# Patient Record
Sex: FEMALE | Race: WHITE | NOT HISPANIC OR LATINO | Employment: OTHER | URBAN - NONMETROPOLITAN AREA
[De-identification: names, ages, dates, MRNs, and addresses within clinical notes are randomized per-mention and may not be internally consistent; named-entity substitution may affect disease eponyms.]

---

## 2018-07-31 ENCOUNTER — OFFICE VISIT (OUTPATIENT)
Dept: PSYCHIATRY | Facility: CLINIC | Age: 48
End: 2018-07-31

## 2018-07-31 VITALS
HEIGHT: 63 IN | BODY MASS INDEX: 31.71 KG/M2 | WEIGHT: 179 LBS | DIASTOLIC BLOOD PRESSURE: 86 MMHG | SYSTOLIC BLOOD PRESSURE: 136 MMHG | HEART RATE: 65 BPM

## 2018-07-31 DIAGNOSIS — E73.9 LACTOSE INTOLERANCE: ICD-10-CM

## 2018-07-31 DIAGNOSIS — K21.9 GASTROESOPHAGEAL REFLUX DISEASE WITHOUT ESOPHAGITIS: ICD-10-CM

## 2018-07-31 DIAGNOSIS — F41.1 GAD (GENERALIZED ANXIETY DISORDER): ICD-10-CM

## 2018-07-31 DIAGNOSIS — F33.1 MDD (MAJOR DEPRESSIVE DISORDER), RECURRENT EPISODE, MODERATE (HCC): Primary | ICD-10-CM

## 2018-07-31 PROCEDURE — 90792 PSYCH DIAG EVAL W/MED SRVCS: CPT | Performed by: NURSE PRACTITIONER

## 2018-07-31 RX ORDER — GABAPENTIN 100 MG/1
CAPSULE ORAL
Refills: 0 | COMMUNITY
Start: 2018-07-07

## 2018-07-31 RX ORDER — MIRTAZAPINE 15 MG/1
15 TABLET, FILM COATED ORAL NIGHTLY
Qty: 30 TABLET | Refills: 0 | Status: SHIPPED | OUTPATIENT
Start: 2018-07-31 | End: 2018-08-28 | Stop reason: SDUPTHER

## 2018-07-31 RX ORDER — ESCITALOPRAM OXALATE 10 MG/1
10 TABLET ORAL DAILY
Qty: 30 TABLET | Refills: 0 | Status: SHIPPED | OUTPATIENT
Start: 2018-07-31 | End: 2018-08-28 | Stop reason: SDUPTHER

## 2018-07-31 RX ORDER — LANSOPRAZOLE 30 MG/1
CAPSULE, DELAYED RELEASE ORAL
Refills: 1 | COMMUNITY
Start: 2018-07-07

## 2018-07-31 RX ORDER — ESTRADIOL 0.5 MG/1
0.5 TABLET ORAL DAILY
Refills: 1 | COMMUNITY
Start: 2018-07-07 | End: 2019-01-22

## 2018-07-31 RX ORDER — FLUTICASONE PROPIONATE 50 MCG
SPRAY, SUSPENSION (ML) NASAL
COMMUNITY
Start: 2017-09-08

## 2018-07-31 RX ORDER — ZOLPIDEM TARTRATE 5 MG/1
TABLET ORAL
Refills: 0 | COMMUNITY
Start: 2018-07-07 | End: 2018-07-31

## 2018-07-31 RX ORDER — MIRTAZAPINE 15 MG/1
15 TABLET, FILM COATED ORAL NIGHTLY
Qty: 30 TABLET | Refills: 0 | Status: SHIPPED | OUTPATIENT
Start: 2018-07-31 | End: 2018-07-31 | Stop reason: SDUPTHER

## 2018-07-31 RX ORDER — ESCITALOPRAM OXALATE 10 MG/1
10 TABLET ORAL DAILY
Qty: 30 TABLET | Refills: 0 | Status: SHIPPED | OUTPATIENT
Start: 2018-07-31 | End: 2018-07-31 | Stop reason: SDUPTHER

## 2018-07-31 RX ORDER — DULOXETIN HYDROCHLORIDE 30 MG/1
CAPSULE, DELAYED RELEASE ORAL
COMMUNITY
End: 2018-07-31

## 2018-07-31 NOTE — PROGRESS NOTES
"Subjective   Sally Domínguez is a 47 y.o. female who is here today for initial appointment.     This provider is located at [facility],  [address]  The patient  is seen remotely at [facility] in [place], [place]  using "[employer]OM, an encrypted service from one [facility] facility to another,  without staff present.    The patient’s condition being diagnosed/treated is appropriate for telemedicine. The provider identified him/herself as well as his or her credentials.     The patient and/or patients guardian consent to be seen remotely, and when consent is given they understand that the consent allows for patient identifiable information to be sent to a third party as needed.   They may refuse to be seen remotely at any time.  The electronic data is encrypted and password protected, and the patient has been advised of the potential risks to privacy notwithstanding such measures.      Chief Complaint:  Depression and anxiety    HPI:  History of Present Illness  patient was referred by her therapist for evaluation of medication related to her mood symptoms.  Patient reports she has been in treatment from an early age.  She does report that she has taken multiple psychotropics in the past and most recently was placed on Cymbalta.  She reports that she feels Cymbalta is helping maybe a little but not much\".  She has been most recently managed by her primary care provider.  She was referred for further evaluation of medication.  Patient has a long history beginning in her early 20s of significant depression she has had previous hospital admission due to severity of symptoms.  Patient present with significant depression. The patient reports depressive symptoms including depressed mood, crying spells, insomnia, hypersomnia, anhedonia, feelings of hopelessness, feelings of worthlessness and low energy, frequent death thoughts, poor concentration, sleep " "difficulty, and have caused impairment in important areas of functioning.  Depression rated 7/10 with 10 being the worst.  She reports she cries excessively-often crying during interview.   Protective factors include family.  Patient also reports significant anxiety-unable to think about anything else.  Worries about children.  She reports some preoccupation with brother in law who shot nephew 6 months ago.  She reports some discord at home with  who \"bosses\" her.  She has great difficulty remembering and concentrating-reports she has recently been attempting to complete her GED.  Patient was screened for obsessive-compulsive disorder she denied any symptoms.  Patient denied any difficulty with psychotic phenomenon.  Patient reports that her depression became much more severe after the birth of her children.  Patient denies any signs and symptoms of PTSD.     Past Psych History: Patient denies any suicide attempts in the past.  She has had one previous admission to [facility].  Has taken prozac, celexa, and zoloft in the past.  States zoloft and prozac run her \"crazy\".  Effexor and Wellbutrin also did not help very much.      Substance Abuse:  She denies any substance abuse-cigarettes, alcohol, drugs.      Past Social History: Patient was born raised in [facility] by parents-she denies any difficulty with abuse.  She is  has 2 children.  Age 17, 27.  She quit school at 14-.  She has one grandchild.  She lives at home with .  She attends [employer] regularly.  She denies any hobbies.      Family History:  family history includes Depression in her brother and mother; Schizophrenia in her maternal grandmother.    Medical/Surgical History:  No past medical history on file.  Past Surgical History:   Procedure Laterality Date   • CHOLECYSTECTOMY     • HYSTERECTOMY         Allergies not on file    Current Medications:   Current Outpatient Prescriptions   Medication Sig Dispense Refill   • " "Cholecalciferol (VITAMIN D3) 1000 units capsule Vitamin D3 2,000 unit tablet   Take 1 tablet every day by oral route.     • fluticasone (FLONASE) 50 MCG/ACT nasal spray fluticasone 50 mcg/actuation nasal spray,suspension   Spray 1 spray every day by nasal route.     • DULoxetine (CYMBALTA) 30 MG capsule duloxetine 30 mg capsule,delayed release     • estradiol (ESTRACE) 0.5 MG tablet Take 0.5 mg by mouth Daily.  1   • gabapentin (NEURONTIN) 100 MG capsule   0   • lansoprazole (PREVACID) 30 MG capsule   1   • zolpidem (AMBIEN) 5 MG tablet   0     No current facility-administered medications for this visit.        Review of Systems    Review of Systems - General ROS: negative for - chills, fever or malaise  Ophthalmic ROS: negative for - loss of vision  ENT ROS: negative for - hearing change  Allergy and Immunology ROS: negative for - hives  Hematological and Lymphatic ROS: negative for - bleeding problems  Endocrine ROS: negative for - skin changes  Respiratory ROS: no cough, shortness of breath, or wheezing  Cardiovascular ROS: no chest pain or dyspnea on exertion  Gastrointestinal ROS: no abdominal pain, change in bowel habits, or black or bloody stools  Genito-Urinary ROS: no dysuria, trouble voiding, or hematuria  Musculoskeletal ROS: negative for - gait disturbance  Neurological ROS: no TIA or stroke symptoms  Dermatological ROS: negative for rash    Objective   Physical Exam  Blood pressure 136/86, pulse 65, height 160 cm (62.99\"), weight 81.2 kg (179 lb).    Mental Status Exam:   Hygiene:   fair  Cooperation:  Cooperative  Eye Contact:  Good  Psychomotor Behavior:  Appropriate  Affect:  Full range  Hopelessness: Denies  Speech:  Normal  Thought Process:  Goal directed and Linear  Thought Content:  Mood congurent  Suicidal:  None  Homicidal:  None  Hallucinations:  None  Delusion:  None  Memory:  Intact  Orientation:  Person, Place, Time and Situation  Reliability:  fair  Insight:  Fair  Judgement:  " Fair  Impulse Control:  Fair  Physical/Medical Issues:  Yes GERD        Assessment/Plan   Diagnoses and all orders for this visit:    MDD (major depressive disorder), recurrent episode, moderate (CMS/HCC)  -     mirtazapine (REMERON) 15 MG tablet; Take 1 tablet by mouth Every Night.  -     escitalopram (LEXAPRO) 10 MG tablet; Take 1 tablet by mouth Daily. Take 1/2 tablet po daily for 1 week then increase to 1 tablet daily    MAU (generalized anxiety disorder)  -     mirtazapine (REMERON) 15 MG tablet; Take 1 tablet by mouth Every Night.  -     escitalopram (LEXAPRO) 10 MG tablet; Take 1 tablet by mouth Daily. Take 1/2 tablet po daily for 1 week then increase to 1 tablet daily    Lactose intolerance  -     mirtazapine (REMERON) 15 MG tablet; Take 1 tablet by mouth Every Night.  -     escitalopram (LEXAPRO) 10 MG tablet; Take 1 tablet by mouth Daily. Take 1/2 tablet po daily for 1 week then increase to 1 tablet daily    Gastroesophageal reflux disease without esophagitis  -     mirtazapine (REMERON) 15 MG tablet; Take 1 tablet by mouth Every Night.  -     escitalopram (LEXAPRO) 10 MG tablet; Take 1 tablet by mouth Daily. Take 1/2 tablet po daily for 1 week then increase to 1 tablet daily        Patient reports that she is having great deal of sleep issues Ambien does not appear to be working well we'll discontinue and substituted with Remeron both for antidepressant effect and sleep.  Patient has done well with Celexa in the past will convert to Lexapro from Cymbalta.  Patient was reminded to check having thoughts to harm herself or others to immediately seek assistance at the emergency room.  Patient is to continue psychotherapy with therapeutic solutions.   Patient will be admitted to clinic- explanation of diagnosis, treatment, treatment options were discussed and patient was agreeable to begin medication trials.    We discussed risks, benefits, and side effects of the above medication and the patient was agreeable  with the plan.    SUPPORTIVE PSYCHOTHERAPY: begining efforts to promote the therapeutic alliance, address the patient’s issues, and strengthen self awareness, insights, and coping skills.  Patient was educated to immediately go to nearest ER for assistance.  Patient was instructed to call clinic for any difficulty with medications.       We discussed risks, benefits, and side effects of the above medication and the patient was agreeable with the plan.     Return in about 4 weeks (around 8/28/2018).       Problem List: depression, anxiety    Short Term Goals:  Patient will be compliant with medication, have no significant medication related side effects.  Patient will be engaged in psychotherapy.  Patient will report subjective improvement of symptoms.         Long Term Goals:Patient will report complete remission of symptoms no longer requiring pharmacologic therapy or psychotherapy.

## 2018-08-28 DIAGNOSIS — F33.1 MDD (MAJOR DEPRESSIVE DISORDER), RECURRENT EPISODE, MODERATE (HCC): ICD-10-CM

## 2018-08-28 DIAGNOSIS — E73.9 LACTOSE INTOLERANCE: ICD-10-CM

## 2018-08-28 DIAGNOSIS — K21.9 GASTROESOPHAGEAL REFLUX DISEASE WITHOUT ESOPHAGITIS: ICD-10-CM

## 2018-08-28 DIAGNOSIS — F41.1 GAD (GENERALIZED ANXIETY DISORDER): ICD-10-CM

## 2018-08-28 RX ORDER — MIRTAZAPINE 15 MG/1
15 TABLET, FILM COATED ORAL NIGHTLY
Qty: 30 TABLET | Refills: 0 | Status: SHIPPED | OUTPATIENT
Start: 2018-08-28 | End: 2018-09-19 | Stop reason: SDUPTHER

## 2018-08-28 RX ORDER — ESCITALOPRAM OXALATE 10 MG/1
10 TABLET ORAL DAILY
Qty: 30 TABLET | Refills: 0 | Status: SHIPPED | OUTPATIENT
Start: 2018-08-28 | End: 2018-09-19 | Stop reason: SDUPTHER

## 2018-09-19 ENCOUNTER — TELEMEDICINE (OUTPATIENT)
Dept: PSYCHIATRY | Facility: CLINIC | Age: 48
End: 2018-09-19

## 2018-09-19 VITALS
SYSTOLIC BLOOD PRESSURE: 137 MMHG | WEIGHT: 193 LBS | BODY MASS INDEX: 34.2 KG/M2 | HEIGHT: 63 IN | DIASTOLIC BLOOD PRESSURE: 82 MMHG | HEART RATE: 87 BPM

## 2018-09-19 DIAGNOSIS — F33.1 MDD (MAJOR DEPRESSIVE DISORDER), RECURRENT EPISODE, MODERATE (HCC): Primary | ICD-10-CM

## 2018-09-19 DIAGNOSIS — F41.1 GAD (GENERALIZED ANXIETY DISORDER): ICD-10-CM

## 2018-09-19 DIAGNOSIS — K21.9 GASTROESOPHAGEAL REFLUX DISEASE WITHOUT ESOPHAGITIS: ICD-10-CM

## 2018-09-19 DIAGNOSIS — E73.9 LACTOSE INTOLERANCE: ICD-10-CM

## 2018-09-19 PROCEDURE — 99214 OFFICE O/P EST MOD 30 MIN: CPT | Performed by: NURSE PRACTITIONER

## 2018-09-19 RX ORDER — ARIPIPRAZOLE 2 MG/1
2 TABLET ORAL EVERY MORNING
Qty: 30 TABLET | Refills: 0 | Status: SHIPPED | OUTPATIENT
Start: 2018-09-19 | End: 2018-10-22 | Stop reason: SDUPTHER

## 2018-09-19 RX ORDER — MIRTAZAPINE 15 MG/1
7.5 TABLET, FILM COATED ORAL NIGHTLY
Qty: 15 TABLET | Refills: 0 | Status: SHIPPED | OUTPATIENT
Start: 2018-09-19 | End: 2018-10-22 | Stop reason: SDUPTHER

## 2018-09-19 RX ORDER — ESCITALOPRAM OXALATE 10 MG/1
10 TABLET ORAL DAILY
Qty: 30 TABLET | Refills: 0 | Status: SHIPPED | OUTPATIENT
Start: 2018-09-19 | End: 2018-10-22 | Stop reason: SDUPTHER

## 2018-09-19 NOTE — PROGRESS NOTES
"Subjective   Sally Domínguez is a 48 y.o. female who is here today for follow up appointment.     This provider is located at [facility],  [address]  The patient  is seen remotely at [facility] in [place], [place]  using [employer], an encrypted service from one [facility] facility to another,  without staff present.    The patient’s condition being diagnosed/treated is appropriate for telemedicine. The provider identified him/herself as well as his or her credentials.     The patient and/or patients guardian consent to be seen remotely, and when consent is given they understand that the consent allows for patient identifiable information to be sent to a third party as needed.   They may refuse to be seen remotely at any time.  The electronic data is encrypted and password protected, and the patient has been advised of the potential risks to privacy notwithstanding such measures.      Chief Complaint:  Depression and anxiety    HPI:  History of Present Illness  patient presents today for follow-up regarding anxiety and depression after medication.  She reports that her symptoms have lessened she however continues to have significant difficulty with anhedonia loss of energy and motivation.  She has had fewer crying spells however and rates her depression as improved.  Patient present with significant depression. The patient reports depressive symptoms including depressed mood, crying spells, insomnia, hypersomnia, anhedonia, feelings of hopelessness, feelings of worthlessness and low energy, frequent death thoughts, poor concentration, sleep difficulty, and have caused impairment in important areas of functioning.  Depression rated 5/10 with 10 being the worst.  She continues to have difficulty with anxiety states that she worries a lot \"I get from my mom\".  Patient reports that she has gained weight and her appetite is excessive.      Family " "History:  family history includes Depression in her brother and mother; Schizophrenia in her maternal grandmother.    Medical/Surgical History:  Past Medical History:   Diagnosis Date   • Chronic low back pain    • Diverticulosis    • GERD (gastroesophageal reflux disease)      Past Surgical History:   Procedure Laterality Date   • CHOLECYSTECTOMY     • HYSTERECTOMY         Allergies not on file    Current Medications:   Current Outpatient Prescriptions   Medication Sig Dispense Refill   • Cholecalciferol (VITAMIN D3) 1000 units capsule Vitamin D3 2,000 unit tablet   Take 1 tablet every day by oral route.     • escitalopram (LEXAPRO) 10 MG tablet Take 1 tablet by mouth Daily. Take 1/2 tablet po daily for 1 week then increase to 1 tablet daily 30 tablet 0   • estradiol (ESTRACE) 0.5 MG tablet Take 0.5 mg by mouth Daily.  1   • fluticasone (FLONASE) 50 MCG/ACT nasal spray fluticasone 50 mcg/actuation nasal spray,suspension   Spray 1 spray every day by nasal route.     • gabapentin (NEURONTIN) 100 MG capsule   0   • lansoprazole (PREVACID) 30 MG capsule   1   • mirtazapine (REMERON) 15 MG tablet Take 1 tablet by mouth Every Night. 30 tablet 0     No current facility-administered medications for this visit.        Review of Systems   Constitutional: Positive for activity change and appetite change.   Musculoskeletal: Positive for arthralgias.   Psychiatric/Behavioral: Positive for dysphoric mood.           Objective   Physical Exam  Blood pressure 137/82, pulse 87, height 160 cm (62.99\"), weight 87.5 kg (193 lb).    Mental Status Exam:   Hygiene:   fair  Cooperation:  Cooperative  Eye Contact:  Good  Psychomotor Behavior:  Appropriate  Affect:  Full range  Hopelessness: Denies  Speech:  Normal  Thought Process:  Goal directed and Linear  Thought Content:  Mood congurent  Suicidal:  None  Homicidal:  None  Hallucinations:  None  Delusion:  None  Memory:  Intact  Orientation:  Person, Place, Time and " Situation  Reliability:  fair  Insight:  Fair  Judgement:  Fair  Impulse Control:  Fair  Physical/Medical Issues:  Yes GERD        Assessment/Plan   Diagnoses and all orders for this visit:    MDD (major depressive disorder), recurrent episode, moderate (CMS/HCC)  -     mirtazapine (REMERON) 15 MG tablet; Take 0.5 tablets by mouth Every Night.  -     ARIPiprazole (ABILIFY) 2 MG tablet; Take 1 tablet by mouth Every Morning.  -     escitalopram (LEXAPRO) 10 MG tablet; Take 1 tablet by mouth Daily. Take 1/2 tablet po daily for 1 week then increase to 1 tablet daily    MAU (generalized anxiety disorder)  -     mirtazapine (REMERON) 15 MG tablet; Take 0.5 tablets by mouth Every Night.  -     ARIPiprazole (ABILIFY) 2 MG tablet; Take 1 tablet by mouth Every Morning.  -     escitalopram (LEXAPRO) 10 MG tablet; Take 1 tablet by mouth Daily. Take 1/2 tablet po daily for 1 week then increase to 1 tablet daily    Lactose intolerance  -     mirtazapine (REMERON) 15 MG tablet; Take 0.5 tablets by mouth Every Night.  -     ARIPiprazole (ABILIFY) 2 MG tablet; Take 1 tablet by mouth Every Morning.  -     escitalopram (LEXAPRO) 10 MG tablet; Take 1 tablet by mouth Daily. Take 1/2 tablet po daily for 1 week then increase to 1 tablet daily    Gastroesophageal reflux disease without esophagitis  -     mirtazapine (REMERON) 15 MG tablet; Take 0.5 tablets by mouth Every Night.  -     ARIPiprazole (ABILIFY) 2 MG tablet; Take 1 tablet by mouth Every Morning.  -     escitalopram (LEXAPRO) 10 MG tablet; Take 1 tablet by mouth Daily. Take 1/2 tablet po daily for 1 week then increase to 1 tablet daily        Patient does appear improved however continues to have residual symptoms.  Patient is continuing to have significant loss of energy and motivation she does have some difficulty with weight gain will cut Remeron in half explained to patient that weight gain and appetite could be part of side effects.  We will also begin low-dose Abilify for  adjuvant treatment of depression along with profile for minimal weight gain.    SUPPORTIVE PSYCHOTHERAPY: begining efforts to promote the therapeutic alliance, address the patient’s issues, and strengthen self awareness, insights, and coping skills.  Patient was educated to immediately go to nearest ER for assistance.  Patient was instructed to call clinic for any difficulty with medications.       We discussed risks, benefits, and side effects of the above medication and the patient was agreeable with the plan.     Return in about 4 weeks (around 10/17/2018).

## 2018-10-22 DIAGNOSIS — F33.1 MDD (MAJOR DEPRESSIVE DISORDER), RECURRENT EPISODE, MODERATE (HCC): ICD-10-CM

## 2018-10-22 DIAGNOSIS — E73.9 LACTOSE INTOLERANCE: ICD-10-CM

## 2018-10-22 DIAGNOSIS — K21.9 GASTROESOPHAGEAL REFLUX DISEASE WITHOUT ESOPHAGITIS: ICD-10-CM

## 2018-10-22 DIAGNOSIS — F41.1 GAD (GENERALIZED ANXIETY DISORDER): ICD-10-CM

## 2018-10-22 RX ORDER — ESCITALOPRAM OXALATE 10 MG/1
10 TABLET ORAL DAILY
Qty: 30 TABLET | Refills: 0 | Status: SHIPPED | OUTPATIENT
Start: 2018-10-22 | End: 2018-10-24 | Stop reason: SDUPTHER

## 2018-10-22 RX ORDER — MIRTAZAPINE 15 MG/1
7.5 TABLET, FILM COATED ORAL NIGHTLY
Qty: 15 TABLET | Refills: 0 | Status: SHIPPED | OUTPATIENT
Start: 2018-10-22 | End: 2018-11-28 | Stop reason: SINTOL

## 2018-10-22 RX ORDER — ARIPIPRAZOLE 2 MG/1
2 TABLET ORAL EVERY MORNING
Qty: 30 TABLET | Refills: 0 | Status: SHIPPED | OUTPATIENT
Start: 2018-10-22 | End: 2018-11-20 | Stop reason: SDUPTHER

## 2018-10-24 DIAGNOSIS — E73.9 LACTOSE INTOLERANCE: ICD-10-CM

## 2018-10-24 DIAGNOSIS — K21.9 GASTROESOPHAGEAL REFLUX DISEASE WITHOUT ESOPHAGITIS: ICD-10-CM

## 2018-10-24 DIAGNOSIS — F33.1 MDD (MAJOR DEPRESSIVE DISORDER), RECURRENT EPISODE, MODERATE (HCC): ICD-10-CM

## 2018-10-24 DIAGNOSIS — F41.1 GAD (GENERALIZED ANXIETY DISORDER): ICD-10-CM

## 2018-10-25 RX ORDER — ESCITALOPRAM OXALATE 10 MG/1
10 TABLET ORAL DAILY
Qty: 30 TABLET | Refills: 0 | Status: SHIPPED | OUTPATIENT
Start: 2018-10-25 | End: 2018-11-28

## 2018-11-20 DIAGNOSIS — F41.1 GAD (GENERALIZED ANXIETY DISORDER): ICD-10-CM

## 2018-11-20 DIAGNOSIS — E73.9 LACTOSE INTOLERANCE: ICD-10-CM

## 2018-11-20 DIAGNOSIS — K21.9 GASTROESOPHAGEAL REFLUX DISEASE WITHOUT ESOPHAGITIS: ICD-10-CM

## 2018-11-20 DIAGNOSIS — F33.1 MDD (MAJOR DEPRESSIVE DISORDER), RECURRENT EPISODE, MODERATE (HCC): ICD-10-CM

## 2018-11-20 RX ORDER — ARIPIPRAZOLE 2 MG/1
2 TABLET ORAL EVERY MORNING
Qty: 30 TABLET | Refills: 0 | Status: SHIPPED | OUTPATIENT
Start: 2018-11-20 | End: 2018-11-26

## 2018-11-23 DIAGNOSIS — F33.1 MDD (MAJOR DEPRESSIVE DISORDER), RECURRENT EPISODE, MODERATE (HCC): ICD-10-CM

## 2018-11-23 DIAGNOSIS — F41.1 GAD (GENERALIZED ANXIETY DISORDER): ICD-10-CM

## 2018-11-23 DIAGNOSIS — E73.9 LACTOSE INTOLERANCE: ICD-10-CM

## 2018-11-23 DIAGNOSIS — K21.9 GASTROESOPHAGEAL REFLUX DISEASE WITHOUT ESOPHAGITIS: ICD-10-CM

## 2018-11-26 RX ORDER — ARIPIPRAZOLE 2 MG/1
2 TABLET ORAL EVERY MORNING
Qty: 30 TABLET | Refills: 0 | Status: SHIPPED | OUTPATIENT
Start: 2018-11-26 | End: 2018-11-28

## 2018-11-28 ENCOUNTER — TELEMEDICINE (OUTPATIENT)
Dept: PSYCHIATRY | Facility: CLINIC | Age: 48
End: 2018-11-28

## 2018-11-28 VITALS
HEART RATE: 85 BPM | HEIGHT: 63 IN | WEIGHT: 201.8 LBS | SYSTOLIC BLOOD PRESSURE: 148 MMHG | DIASTOLIC BLOOD PRESSURE: 94 MMHG | BODY MASS INDEX: 35.75 KG/M2

## 2018-11-28 DIAGNOSIS — F41.1 GAD (GENERALIZED ANXIETY DISORDER): ICD-10-CM

## 2018-11-28 DIAGNOSIS — E73.9 LACTOSE INTOLERANCE: ICD-10-CM

## 2018-11-28 DIAGNOSIS — F33.1 MDD (MAJOR DEPRESSIVE DISORDER), RECURRENT EPISODE, MODERATE (HCC): ICD-10-CM

## 2018-11-28 DIAGNOSIS — K21.9 GASTROESOPHAGEAL REFLUX DISEASE WITHOUT ESOPHAGITIS: ICD-10-CM

## 2018-11-28 PROCEDURE — 99214 OFFICE O/P EST MOD 30 MIN: CPT | Performed by: NURSE PRACTITIONER

## 2018-11-28 RX ORDER — ARIPIPRAZOLE 2 MG/1
2 TABLET ORAL EVERY MORNING
Qty: 30 TABLET | Refills: 0 | Status: SHIPPED | OUTPATIENT
Start: 2018-11-28 | End: 2018-12-03

## 2018-11-28 RX ORDER — ESCITALOPRAM OXALATE 10 MG/1
10 TABLET ORAL DAILY
Qty: 30 TABLET | Refills: 0 | Status: SHIPPED | OUTPATIENT
Start: 2018-11-28 | End: 2018-12-03

## 2018-11-28 RX ORDER — CELECOXIB 200 MG/1
CAPSULE ORAL
Refills: 0 | COMMUNITY
Start: 2018-10-30

## 2018-11-28 NOTE — PROGRESS NOTES
Subjective   Sally Domínguez is a 48 y.o. female who is here today for follow up appointment.     This provider is located at [facility],  [address]  The patient  is seen remotely at [facility] in [place], [place]  using [employer], an encrypted service from one [facility] facility to another,  without staff present.    The patient’s condition being diagnosed/treated is appropriate for telemedicine. The provider identified him/herself as well as his or her credentials.     The patient and/or patients guardian consent to be seen remotely, and when consent is given they understand that the consent allows for patient identifiable information to be sent to a third party as needed.   They may refuse to be seen remotely at any time.  The electronic data is encrypted and password protected, and the patient has been advised of the potential risks to privacy notwithstanding such measures.      Chief Complaint:  Depression and anxiety    HPI:  History of Present Illness  patient presents today for follow-up regarding anxiety and depression after medication.  She is greatly concerned about weight gain.  Patient is having noted increase in weight since beginning medications.  She reports that her symptoms have lessened she however continues to have significant difficulty with anhedonia loss of energy and motivation.  She has had fewer crying spells however and rates her depression as improved. The patient reports depressive symptoms including depressed mood, crying spells, insomnia, hypersomnia, anhedonia, feelings of hopelessness, feelings of worthlessness and low energy, frequent death thoughts, poor concentration, sleep difficulty, and have caused impairment in important areas of functioning.  Depression rated 6/10 with 10 being the worst.  She continues to have difficulty with anxiety states it's a lot better.  Patiient reports that she has gained weight and  "her appetite is excessive.      Family History:  family history includes Depression in her brother and mother; Schizophrenia in her maternal grandmother.    Medical/Surgical History:  Past Medical History:   Diagnosis Date   • Chronic low back pain    • Diverticulosis    • GERD (gastroesophageal reflux disease)      Past Surgical History:   Procedure Laterality Date   • CHOLECYSTECTOMY     • HYSTERECTOMY         No Known Allergies    Current Medications:   Current Outpatient Medications   Medication Sig Dispense Refill   • ARIPiprazole (ABILIFY) 2 MG tablet TAKE 1 TABLET BY MOUTH EVERY MORNING 30 tablet 0   • Cholecalciferol (VITAMIN D3) 1000 units capsule Vitamin D3 2,000 unit tablet   Take 1 tablet every day by oral route.     • escitalopram (LEXAPRO) 10 MG tablet Take 1 tablet by mouth Daily. 30 tablet 0   • estradiol (ESTRACE) 0.5 MG tablet Take 0.5 mg by mouth Daily.  1   • fluticasone (FLONASE) 50 MCG/ACT nasal spray fluticasone 50 mcg/actuation nasal spray,suspension   Spray 1 spray every day by nasal route.     • gabapentin (NEURONTIN) 100 MG capsule   0   • lansoprazole (PREVACID) 30 MG capsule   1   • mirtazapine (REMERON) 15 MG tablet Take 0.5 tablets by mouth Every Night. 15 tablet 0     No current facility-administered medications for this visit.        Review of Systems   Constitutional: Positive for activity change and appetite change.   Musculoskeletal: Positive for arthralgias.   Psychiatric/Behavioral: Positive for dysphoric mood.           Objective   Physical Exam  Blood pressure 148/94, pulse 85, height 160 cm (62.99\"), weight 91.5 kg (201 lb 12.8 oz).    Mental Status Exam:   Hygiene:   fair  Cooperation:  Cooperative  Eye Contact:  Good  Psychomotor Behavior:  Appropriate  Affect:  Full range  Hopelessness: Denies  Speech:  Normal  Thought Process:  Goal directed and Linear  Thought Content:  Mood congurent  Suicidal:  None  Homicidal:  None  Hallucinations:  None  Delusion:  None  Memory:  " Intact  Orientation:  Person, Place, Time and Situation  Reliability:  fair  Insight:  Fair  Judgement:  Fair  Impulse Control:  Fair  Physical/Medical Issues:  Yes GERD        Assessment/Plan   Diagnoses and all orders for this visit:    MDD (major depressive disorder), recurrent episode, moderate (CMS/HCC)  -     escitalopram (LEXAPRO) 10 MG tablet; Take 1 tablet by mouth Daily.  -     ARIPiprazole (ABILIFY) 2 MG tablet; Take 1 tablet by mouth Every Morning.    MAU (generalized anxiety disorder)  -     escitalopram (LEXAPRO) 10 MG tablet; Take 1 tablet by mouth Daily.  -     ARIPiprazole (ABILIFY) 2 MG tablet; Take 1 tablet by mouth Every Morning.    Lactose intolerance  -     escitalopram (LEXAPRO) 10 MG tablet; Take 1 tablet by mouth Daily.  -     ARIPiprazole (ABILIFY) 2 MG tablet; Take 1 tablet by mouth Every Morning.    Gastroesophageal reflux disease without esophagitis  -     escitalopram (LEXAPRO) 10 MG tablet; Take 1 tablet by mouth Daily.  -     ARIPiprazole (ABILIFY) 2 MG tablet; Take 1 tablet by mouth Every Morning.        Patient does appear improved however continues to have residual symptoms.  Patient is continuing to have significant loss of energy and motivation she does have some difficulty with weight gain will cut Remeron in half explained to patient that weight gain and appetite could be part of side effects.  We will also begin low-dose Abilify for adjuvant treatment of depression along with profile for minimal weight gain.    SUPPORTIVE PSYCHOTHERAPY: begining efforts to promote the therapeutic alliance, address the patient’s issues, and strengthen self awareness, insights, and coping skills.  Patient was educated to immediately go to nearest ER for assistance.  Patient was instructed to call clinic for any difficulty with medications.       We discussed risks, benefits, and side effects of the above medication and the patient was agreeable with the plan.     Return in about 3 weeks (around  12/19/2018).

## 2018-11-29 ENCOUNTER — TELEPHONE (OUTPATIENT)
Dept: PSYCHIATRY | Facility: CLINIC | Age: 48
End: 2018-11-29

## 2018-12-03 DIAGNOSIS — F33.1 MDD (MAJOR DEPRESSIVE DISORDER), RECURRENT EPISODE, MODERATE (HCC): ICD-10-CM

## 2018-12-03 DIAGNOSIS — E73.9 LACTOSE INTOLERANCE: ICD-10-CM

## 2018-12-03 DIAGNOSIS — F41.1 GAD (GENERALIZED ANXIETY DISORDER): ICD-10-CM

## 2018-12-03 DIAGNOSIS — K21.9 GASTROESOPHAGEAL REFLUX DISEASE WITHOUT ESOPHAGITIS: ICD-10-CM

## 2018-12-03 RX ORDER — ARIPIPRAZOLE 2 MG/1
2 TABLET ORAL EVERY MORNING
Qty: 30 TABLET | Refills: 0 | Status: SHIPPED | OUTPATIENT
Start: 2018-12-03 | End: 2018-12-18

## 2018-12-03 RX ORDER — ESCITALOPRAM OXALATE 10 MG/1
10 TABLET ORAL DAILY
Qty: 30 TABLET | Refills: 0 | Status: SHIPPED | OUTPATIENT
Start: 2018-12-03 | End: 2018-12-18

## 2018-12-18 ENCOUNTER — TELEMEDICINE (OUTPATIENT)
Dept: PSYCHIATRY | Facility: CLINIC | Age: 48
End: 2018-12-18

## 2018-12-18 VITALS
SYSTOLIC BLOOD PRESSURE: 115 MMHG | DIASTOLIC BLOOD PRESSURE: 83 MMHG | HEIGHT: 63 IN | HEART RATE: 77 BPM | BODY MASS INDEX: 35.79 KG/M2 | WEIGHT: 202 LBS

## 2018-12-18 DIAGNOSIS — F33.1 MDD (MAJOR DEPRESSIVE DISORDER), RECURRENT EPISODE, MODERATE (HCC): Primary | ICD-10-CM

## 2018-12-18 DIAGNOSIS — K21.9 GASTROESOPHAGEAL REFLUX DISEASE WITHOUT ESOPHAGITIS: ICD-10-CM

## 2018-12-18 DIAGNOSIS — F41.1 GAD (GENERALIZED ANXIETY DISORDER): ICD-10-CM

## 2018-12-18 DIAGNOSIS — E73.9 LACTOSE INTOLERANCE: ICD-10-CM

## 2018-12-18 PROCEDURE — 99213 OFFICE O/P EST LOW 20 MIN: CPT | Performed by: NURSE PRACTITIONER

## 2018-12-18 RX ORDER — ESCITALOPRAM OXALATE 10 MG/1
10 TABLET ORAL DAILY
Qty: 30 TABLET | Refills: 1 | Status: SHIPPED | OUTPATIENT
Start: 2018-12-18 | End: 2019-01-22 | Stop reason: SDUPTHER

## 2018-12-18 RX ORDER — ARIPIPRAZOLE 2 MG/1
2 TABLET ORAL EVERY MORNING
Qty: 30 TABLET | Refills: 1 | Status: SHIPPED | OUTPATIENT
Start: 2018-12-18 | End: 2019-01-22 | Stop reason: SDUPTHER

## 2018-12-18 NOTE — PROGRESS NOTES
Subjective   Sally Domínguez is a 48 y.o. female who is here today for follow up appointment.     This provider is located at [facility],  [address]  The patient  is seen remotely at [facility] in [place], [place]  using [employer], an encrypted service from one [facility] facility to another,  without staff present.    The patient’s condition being diagnosed/treated is appropriate for telemedicine. The provider identified him/herself as well as his or her credentials.     The patient and/or patients guardian consent to be seen remotely, and when consent is given they understand that the consent allows for patient identifiable information to be sent to a third party as needed.   They may refuse to be seen remotely at any time.  The electronic data is encrypted and password protected, and the patient has been advised of the potential risks to privacy notwithstanding such measures.      Chief Complaint:  Depression and anxiety    HPI:  History of Present Illness  patient presents today for follow-up regarding anxiety and depression after medication.She continues to have some concern about weight gain-has somewhat stablized.   She reports that her symptoms have greatly improved she however continues to have times that she has anhedonia loss of energy and motivation.  She denies any crying spells however and rates her depression as improved. The patient reports depressive symptoms including depressed mood, crying spells, insomnia, hypersomnia, anhedonia, feelings of hopelessness, feelings of worthlessness and low energy, frequent death thoughts, poor concentration, sleep difficulty, and have caused impairment in important areas of functioning.  Depression rated 5/10 with 10 being the worst.  . Patient has been able to engage with family during holidays.  She continues to have difficulty with anxiety states it's a lot better      Family History:  family  history includes Depression in her brother and mother; Schizophrenia in her maternal grandmother.    Medical/Surgical History:  Past Medical History:   Diagnosis Date   • Chronic low back pain    • Diverticulosis    • GERD (gastroesophageal reflux disease)      Past Surgical History:   Procedure Laterality Date   • CHOLECYSTECTOMY     • HYSTERECTOMY         No Known Allergies    Current Medications:   Current Outpatient Medications   Medication Sig Dispense Refill   • ARIPiprazole (ABILIFY) 2 MG tablet Take 1 tablet by mouth Every Morning. 30 tablet 0   • celecoxib (CeleBREX) 200 MG capsule TAKE 1 CAPSULE BY MOUTH TWICE DAILY WITH FOOD  0   • Cholecalciferol (VITAMIN D3) 1000 units capsule Vitamin D3 2,000 unit tablet   Take 1 tablet every day by oral route.     • escitalopram (LEXAPRO) 10 MG tablet Take 1 tablet by mouth Daily. 30 tablet 0   • estradiol (ESTRACE) 0.5 MG tablet Take 0.5 mg by mouth Daily.  1   • fluticasone (FLONASE) 50 MCG/ACT nasal spray fluticasone 50 mcg/actuation nasal spray,suspension   Spray 1 spray every day by nasal route.     • gabapentin (NEURONTIN) 100 MG capsule   0   • lansoprazole (PREVACID) 30 MG capsule   1     No current facility-administered medications for this visit.        Review of Systems   Constitutional: Positive for activity change and appetite change.   Musculoskeletal: Positive for arthralgias.   Psychiatric/Behavioral: Positive for dysphoric mood.            Objective   Physical Exam      Mental Status Exam:   Hygiene:   fair  Cooperation:  Cooperative  Eye Contact:  Good  Psychomotor Behavior:  Appropriate  Affect:  Full range  Hopelessness: Denies  Speech:  Normal  Thought Process:  Goal directed and Linear  Thought Content:  Mood congurent  Suicidal:  None  Homicidal:  None  Hallucinations:  None  Delusion:  None  Memory:  Intact  Orientation:  Person, Place, Time and Situation  Reliability:  fair  Insight:  Fair  Judgement:  Fair  Impulse Control:   Fair  Physical/Medical Issues:  Yes GERD        Assessment/Plan   Diagnoses and all orders for this visit:    MDD (major depressive disorder), recurrent episode, moderate (CMS/HCC)  -     escitalopram (LEXAPRO) 10 MG tablet; Take 1 tablet by mouth Daily.  -     ARIPiprazole (ABILIFY) 2 MG tablet; Take 1 tablet by mouth Every Morning.    MAU (generalized anxiety disorder)  -     escitalopram (LEXAPRO) 10 MG tablet; Take 1 tablet by mouth Daily.  -     ARIPiprazole (ABILIFY) 2 MG tablet; Take 1 tablet by mouth Every Morning.    Lactose intolerance  -     escitalopram (LEXAPRO) 10 MG tablet; Take 1 tablet by mouth Daily.  -     ARIPiprazole (ABILIFY) 2 MG tablet; Take 1 tablet by mouth Every Morning.    Gastroesophageal reflux disease without esophagitis  -     escitalopram (LEXAPRO) 10 MG tablet; Take 1 tablet by mouth Daily.  -     ARIPiprazole (ABILIFY) 2 MG tablet; Take 1 tablet by mouth Every Morning.        Patient does appear improved however continues to have minor minor residual symptoms.  Patient's weight has somewhat stabilized since beginning Abilify encouraged her to check with her primary care provider to have thyroid and other lab work done related to possible weight gain.  Patient was again provided extensive education regarding healthy diet portion control and calorie consumption.  Patient was receptive and agreed to make an appointment with her primary care provider as soon as possible.  SUPPORTIVE PSYCHOTHERAPY: begining efforts to promote the therapeutic alliance, address the patient’s issues, and strengthen self awareness, insights, and coping skills.  Patient was educated to immediately go to nearest ER for assistance.  Patient was instructed to call clinic for any difficulty with medications.       We discussed risks, benefits, and side effects of the above medication and the patient was agreeable with the plan.     Return in about 4 weeks (around 1/15/2019).

## 2019-01-22 ENCOUNTER — TELEMEDICINE (OUTPATIENT)
Dept: PSYCHIATRY | Facility: CLINIC | Age: 49
End: 2019-01-22

## 2019-01-22 VITALS
BODY MASS INDEX: 36.71 KG/M2 | SYSTOLIC BLOOD PRESSURE: 135 MMHG | HEIGHT: 63 IN | HEART RATE: 82 BPM | WEIGHT: 207.2 LBS | DIASTOLIC BLOOD PRESSURE: 81 MMHG

## 2019-01-22 DIAGNOSIS — F41.1 GAD (GENERALIZED ANXIETY DISORDER): ICD-10-CM

## 2019-01-22 DIAGNOSIS — E73.9 LACTOSE INTOLERANCE: ICD-10-CM

## 2019-01-22 DIAGNOSIS — K21.9 GASTROESOPHAGEAL REFLUX DISEASE WITHOUT ESOPHAGITIS: ICD-10-CM

## 2019-01-22 DIAGNOSIS — F33.1 MDD (MAJOR DEPRESSIVE DISORDER), RECURRENT EPISODE, MODERATE (HCC): ICD-10-CM

## 2019-01-22 PROCEDURE — 99214 OFFICE O/P EST MOD 30 MIN: CPT | Performed by: NURSE PRACTITIONER

## 2019-01-22 RX ORDER — HYDROXYZINE HYDROCHLORIDE 10 MG/1
10 TABLET, FILM COATED ORAL 3 TIMES DAILY PRN
Qty: 45 TABLET | Refills: 0 | Status: SHIPPED | OUTPATIENT
Start: 2019-01-22 | End: 2019-03-26 | Stop reason: SDUPTHER

## 2019-01-22 RX ORDER — ARIPIPRAZOLE 2 MG/1
2 TABLET ORAL EVERY MORNING
Qty: 30 TABLET | Refills: 1 | Status: SHIPPED | OUTPATIENT
Start: 2019-01-22 | End: 2019-03-26 | Stop reason: SDUPTHER

## 2019-01-22 RX ORDER — ESCITALOPRAM OXALATE 10 MG/1
10 TABLET ORAL DAILY
Qty: 30 TABLET | Refills: 1 | Status: SHIPPED | OUTPATIENT
Start: 2019-01-22 | End: 2019-03-26 | Stop reason: SDUPTHER

## 2019-01-22 RX ORDER — ESTRADIOL 0.5 MG/1
TABLET ORAL
COMMUNITY

## 2019-01-22 NOTE — PROGRESS NOTES
Subjective   Sally Domínguez is a 48 y.o. female who is here today for follow up appointment.     This provider is located at [facility],  [address]  The patient  is seen remotely at [facility] in [place], [place]  using [employer], an encrypted service from one [facility] facility to another,  without staff present.    The patient’s condition being diagnosed/treated is appropriate for telemedicine. The provider identified him/herself as well as his or her credentials.     The patient and/or patients guardian consent to be seen remotely, and when consent is given they understand that the consent allows for patient identifiable information to be sent to a third party as needed.   They may refuse to be seen remotely at any time.  The electronic data is encrypted and password protected, and the patient has been advised of the potential risks to privacy notwithstanding such measures.      Chief Complaint:  Depression and anxiety    HPI:  History of Present Illness  patient presents today for follow-up regarding anxiety and depression after medication.She continues to have some concern about weight gain-has somewhat stablized.  Patient reports that she is having some difficulty with anxiety especially in the morning.  She reports that her symptoms have greatly improved she however continues to have times that she has anhedonia loss of energy and motivation.  She denies any crying spells however and rates her depression as improved. The patient reports depressive symptoms including depressed mood, crying spells, insomnia, hypersomnia, anhedonia, feelings of hopelessness, feelings of worthlessness and low energy, frequent death thoughts, poor concentration, sleep difficulty, and have caused impairment in important areas of functioning.  Depression rated 5/10 with 10 being the worst.  . Patient has been able to engage with family during  holidays.        Family History:  family history includes Depression in her brother and mother; Schizophrenia in her maternal grandmother.    Medical/Surgical History:  Past Medical History:   Diagnosis Date   • Chronic low back pain    • Diverticulosis    • GERD (gastroesophageal reflux disease)      Past Surgical History:   Procedure Laterality Date   • CHOLECYSTECTOMY     • HYSTERECTOMY         No Known Allergies    Current Medications:   Current Outpatient Medications   Medication Sig Dispense Refill   • ARIPiprazole (ABILIFY) 2 MG tablet Take 1 tablet by mouth Every Morning. 30 tablet 1   • celecoxib (CeleBREX) 200 MG capsule TAKE 1 CAPSULE BY MOUTH TWICE DAILY WITH FOOD  0   • Cholecalciferol (VITAMIN D3) 1000 units capsule Vitamin D3 2,000 unit tablet   Take 1 tablet every day by oral route.     • escitalopram (LEXAPRO) 10 MG tablet Take 1 tablet by mouth Daily. 30 tablet 1   • estradiol (ESTRACE) 0.5 MG tablet Take 0.5 mg by mouth Daily.  1   • fluticasone (FLONASE) 50 MCG/ACT nasal spray fluticasone 50 mcg/actuation nasal spray,suspension   Spray 1 spray every day by nasal route.     • gabapentin (NEURONTIN) 100 MG capsule   0   • lansoprazole (PREVACID) 30 MG capsule   1     No current facility-administered medications for this visit.        Review of Systems   Constitutional: Positive for activity change and appetite change.   Musculoskeletal: Positive for arthralgias.   Psychiatric/Behavioral: Positive for dysphoric mood.            Objective   Physical Exam      Mental Status Exam:   Hygiene:   fair  Cooperation:  Cooperative  Eye Contact:  Good  Psychomotor Behavior:  Appropriate  Affect:  Full range  Hopelessness: Denies  Speech:  Normal  Thought Process:  Goal directed and Linear  Thought Content:  Mood congurent  Suicidal:  None  Homicidal:  None  Hallucinations:  None  Delusion:  None  Memory:  Intact  Orientation:  Person, Place, Time and Situation  Reliability:  fair  Insight:  Fair  Judgement:   Fair  Impulse Control:  Fair  Physical/Medical Issues:  Yes GERD        Assessment/Plan   Diagnoses and all orders for this visit:    MDD (major depressive disorder), recurrent episode, moderate (CMS/HCC)  -     ARIPiprazole (ABILIFY) 2 MG tablet; Take 1 tablet by mouth Every Morning.  -     escitalopram (LEXAPRO) 10 MG tablet; Take 1 tablet by mouth Daily.  -     hydrOXYzine (ATARAX) 10 MG tablet; Take 1 tablet by mouth 3 (Three) Times a Day As Needed for Anxiety.    MAU (generalized anxiety disorder)  -     ARIPiprazole (ABILIFY) 2 MG tablet; Take 1 tablet by mouth Every Morning.  -     escitalopram (LEXAPRO) 10 MG tablet; Take 1 tablet by mouth Daily.  -     hydrOXYzine (ATARAX) 10 MG tablet; Take 1 tablet by mouth 3 (Three) Times a Day As Needed for Anxiety.    Lactose intolerance  -     ARIPiprazole (ABILIFY) 2 MG tablet; Take 1 tablet by mouth Every Morning.  -     escitalopram (LEXAPRO) 10 MG tablet; Take 1 tablet by mouth Daily.  -     hydrOXYzine (ATARAX) 10 MG tablet; Take 1 tablet by mouth 3 (Three) Times a Day As Needed for Anxiety.    Gastroesophageal reflux disease without esophagitis  -     ARIPiprazole (ABILIFY) 2 MG tablet; Take 1 tablet by mouth Every Morning.  -     escitalopram (LEXAPRO) 10 MG tablet; Take 1 tablet by mouth Daily.  -     hydrOXYzine (ATARAX) 10 MG tablet; Take 1 tablet by mouth 3 (Three) Times a Day As Needed for Anxiety.        Patient does appear improved however continues to have minor minor residual symptoms.  Patient's weight has somewhat stabilized since beginning Abilify will give low-dose Vistaril for assistance with periodic anxiety.  Patient was again provided extensive education regarding healthy diet portion control and calorie consumption.  Patient was receptive and agreed to make an appointment with her primary care provider as soon as possible.  SUPPORTIVE PSYCHOTHERAPY: begining efforts to promote the therapeutic alliance, address the patient’s issues, and  strengthen self awareness, insights, and coping skills.  Patient was educated to immediately go to nearest ER for assistance.  Patient was instructed to call clinic for any difficulty with medications.       We discussed risks, benefits, and side effects of the above medication and the patient was agreeable with the plan.     No Follow-up on file.

## 2019-03-26 ENCOUNTER — TELEMEDICINE (OUTPATIENT)
Dept: PSYCHIATRY | Facility: CLINIC | Age: 49
End: 2019-03-26

## 2019-03-26 VITALS
DIASTOLIC BLOOD PRESSURE: 90 MMHG | WEIGHT: 211 LBS | SYSTOLIC BLOOD PRESSURE: 131 MMHG | HEART RATE: 85 BPM | BODY MASS INDEX: 37.39 KG/M2 | HEIGHT: 63 IN

## 2019-03-26 DIAGNOSIS — F41.1 GAD (GENERALIZED ANXIETY DISORDER): ICD-10-CM

## 2019-03-26 DIAGNOSIS — E73.9 LACTOSE INTOLERANCE: ICD-10-CM

## 2019-03-26 DIAGNOSIS — K21.9 GASTROESOPHAGEAL REFLUX DISEASE WITHOUT ESOPHAGITIS: ICD-10-CM

## 2019-03-26 DIAGNOSIS — F33.1 MDD (MAJOR DEPRESSIVE DISORDER), RECURRENT EPISODE, MODERATE (HCC): Primary | ICD-10-CM

## 2019-03-26 PROCEDURE — 99213 OFFICE O/P EST LOW 20 MIN: CPT | Performed by: NURSE PRACTITIONER

## 2019-03-26 RX ORDER — ARIPIPRAZOLE 2 MG/1
2 TABLET ORAL EVERY MORNING
Qty: 30 TABLET | Refills: 1 | Status: SHIPPED | OUTPATIENT
Start: 2019-03-26 | End: 2019-06-17 | Stop reason: SDUPTHER

## 2019-03-26 RX ORDER — HYDROXYZINE HYDROCHLORIDE 10 MG/1
10 TABLET, FILM COATED ORAL 3 TIMES DAILY PRN
Qty: 45 TABLET | Refills: 0 | Status: SHIPPED | OUTPATIENT
Start: 2019-03-26 | End: 2019-04-18 | Stop reason: SDUPTHER

## 2019-03-26 RX ORDER — ESCITALOPRAM OXALATE 10 MG/1
10 TABLET ORAL DAILY
Qty: 30 TABLET | Refills: 1 | Status: SHIPPED | OUTPATIENT
Start: 2019-03-26 | End: 2019-05-29 | Stop reason: SDUPTHER

## 2019-03-26 NOTE — PROGRESS NOTES
"Subjective   Sally Domínguez is a 48 y.o. female who is here today for follow up appointment.     This provider is located at [facility],  [address]  The patient  is seen remotely at [facility] in [place], [place]  using [employer], an encrypted service from one [facility] facility to another,  without staff present.    The patient’s condition being diagnosed/treated is appropriate for telemedicine. The provider identified him/herself as well as his or her credentials.     The patient and/or patients guardian consent to be seen remotely, and when consent is given they understand that the consent allows for patient identifiable information to be sent to a third party as needed.   They may refuse to be seen remotely at any time.  The electronic data is encrypted and password protected, and the patient has been advised of the potential risks to privacy notwithstanding such measures.      Chief Complaint:  Depression and anxiety    HPI:  History of Present Illness  patient presents today for follow-up regarding anxiety and depression after medication.She reports that her  continuies to stay in house and \"dont' do much\".  She recently stared humura.  She continues to have some concern about weight gain-has somewhat stablized.  Patient reports that she is having some ongoing difficulty with anxiety especially in the morning.  She reports that her symptoms have greatly improved she however continues to have times that she has anhedonia loss of energy and motivation.  She denies any crying spells however and rates her depression as improved. The patient reports depressive symptoms including depressed mood, crying spells, insomnia, hypersomnia, anhedonia, feelings of hopelessness, feelings of worthlessness and low energy, frequent death thoughts, poor concentration, sleep difficulty, and have caused impairment in important areas of functioning.  " Depression rated 7/10 with 10 being the worst.  .  Family History:  family history includes Depression in her brother and mother; Schizophrenia in her maternal grandmother.    Medical/Surgical History:  Past Medical History:   Diagnosis Date   • Chronic low back pain    • Diverticulosis    • GERD (gastroesophageal reflux disease)      Past Surgical History:   Procedure Laterality Date   • CHOLECYSTECTOMY     • HYSTERECTOMY         No Known Allergies    Current Medications:   Current Outpatient Medications   Medication Sig Dispense Refill   • ARIPiprazole (ABILIFY) 2 MG tablet Take 1 tablet by mouth Every Morning. 30 tablet 1   • celecoxib (CeleBREX) 200 MG capsule TAKE 1 CAPSULE BY MOUTH TWICE DAILY WITH FOOD  0   • Cholecalciferol (VITAMIN D3) 1000 units capsule Vitamin D3 2,000 unit tablet   Take 1 tablet every day by oral route.     • escitalopram (LEXAPRO) 10 MG tablet Take 1 tablet by mouth Daily. 30 tablet 1   • estradiol (ESTRACE) 0.5 MG tablet estradiol 0.5 mg tablet   take 1 tablet by mouth once daily     • fluticasone (FLONASE) 50 MCG/ACT nasal spray fluticasone 50 mcg/actuation nasal spray,suspension   Spray 1 spray every day by nasal route.     • gabapentin (NEURONTIN) 100 MG capsule   0   • hydrOXYzine (ATARAX) 10 MG tablet Take 1 tablet by mouth 3 (Three) Times a Day As Needed for Anxiety. 45 tablet 0   • lansoprazole (PREVACID) 30 MG capsule   1     No current facility-administered medications for this visit.        Review of Systems   Constitutional: Positive for activity change and appetite change.   Musculoskeletal: Positive for arthralgias.   Psychiatric/Behavioral: Positive for dysphoric mood.            Objective   Physical Exam      Mental Status Exam:   Hygiene:   fair  Cooperation:  Cooperative  Eye Contact:  Good  Psychomotor Behavior:  Appropriate  Affect:  Full range  Hopelessness: Denies  Speech:  Normal  Thought Process:  Goal directed and Linear  Thought Content:  Mood  congurent  Suicidal:  None  Homicidal:  None  Hallucinations:  None  Delusion:  None  Memory:  Intact  Orientation:  Person, Place, Time and Situation  Reliability:  fair  Insight:  Fair  Judgement:  Fair  Impulse Control:  Fair  Physical/Medical Issues:  Yes GERD        Assessment/Plan   Diagnoses and all orders for this visit:    MDD (major depressive disorder), recurrent episode, moderate (CMS/HCC)  -     ARIPiprazole (ABILIFY) 2 MG tablet; Take 1 tablet by mouth Every Morning.  -     escitalopram (LEXAPRO) 10 MG tablet; Take 1 tablet by mouth Daily.  -     hydrOXYzine (ATARAX) 10 MG tablet; Take 1 tablet by mouth 3 (Three) Times a Day As Needed for Anxiety.    MAU (generalized anxiety disorder)  -     ARIPiprazole (ABILIFY) 2 MG tablet; Take 1 tablet by mouth Every Morning.  -     escitalopram (LEXAPRO) 10 MG tablet; Take 1 tablet by mouth Daily.  -     hydrOXYzine (ATARAX) 10 MG tablet; Take 1 tablet by mouth 3 (Three) Times a Day As Needed for Anxiety.    Lactose intolerance  -     ARIPiprazole (ABILIFY) 2 MG tablet; Take 1 tablet by mouth Every Morning.  -     escitalopram (LEXAPRO) 10 MG tablet; Take 1 tablet by mouth Daily.  -     hydrOXYzine (ATARAX) 10 MG tablet; Take 1 tablet by mouth 3 (Three) Times a Day As Needed for Anxiety.    Gastroesophageal reflux disease without esophagitis  -     ARIPiprazole (ABILIFY) 2 MG tablet; Take 1 tablet by mouth Every Morning.  -     escitalopram (LEXAPRO) 10 MG tablet; Take 1 tablet by mouth Daily.  -     hydrOXYzine (ATARAX) 10 MG tablet; Take 1 tablet by mouth 3 (Three) Times a Day As Needed for Anxiety.      Will continue medication and check thyriod.    Patient was again provided extensive education regarding healthy diet portion control and calorie consumption.  Patient was receptive and agreed to make an appointment with her primary care provider as soon as possible.  SUPPORTIVE PSYCHOTHERAPY: begining efforts to promote the therapeutic alliance, address the  patient’s issues, and strengthen self awareness, insights, and coping skills.  Patient was educated to immediately go to nearest ER for assistance.  Patient was instructed to call clinic for any difficulty with medications.       We discussed risks, benefits, and side effects of the above medication and the patient was agreeable with the plan.     Return in about 6 weeks (around 5/7/2019).

## 2019-04-18 DIAGNOSIS — K21.9 GASTROESOPHAGEAL REFLUX DISEASE WITHOUT ESOPHAGITIS: ICD-10-CM

## 2019-04-18 DIAGNOSIS — E73.9 LACTOSE INTOLERANCE: ICD-10-CM

## 2019-04-18 DIAGNOSIS — F33.1 MDD (MAJOR DEPRESSIVE DISORDER), RECURRENT EPISODE, MODERATE (HCC): ICD-10-CM

## 2019-04-18 DIAGNOSIS — F41.1 GAD (GENERALIZED ANXIETY DISORDER): ICD-10-CM

## 2019-04-18 RX ORDER — HYDROXYZINE HYDROCHLORIDE 10 MG/1
10 TABLET, FILM COATED ORAL 3 TIMES DAILY PRN
Qty: 45 TABLET | Refills: 0 | Status: SHIPPED | OUTPATIENT
Start: 2019-04-18 | End: 2019-05-29 | Stop reason: SDUPTHER

## 2019-05-29 DIAGNOSIS — F33.1 MDD (MAJOR DEPRESSIVE DISORDER), RECURRENT EPISODE, MODERATE (HCC): ICD-10-CM

## 2019-05-29 DIAGNOSIS — F41.1 GAD (GENERALIZED ANXIETY DISORDER): ICD-10-CM

## 2019-05-29 DIAGNOSIS — K21.9 GASTROESOPHAGEAL REFLUX DISEASE WITHOUT ESOPHAGITIS: ICD-10-CM

## 2019-05-29 DIAGNOSIS — E73.9 LACTOSE INTOLERANCE: ICD-10-CM

## 2019-05-29 RX ORDER — ESCITALOPRAM OXALATE 10 MG/1
10 TABLET ORAL DAILY
Qty: 30 TABLET | Refills: 1 | Status: SHIPPED | OUTPATIENT
Start: 2019-05-29 | End: 2019-07-15 | Stop reason: SDUPTHER

## 2019-05-29 RX ORDER — HYDROXYZINE HYDROCHLORIDE 10 MG/1
10 TABLET, FILM COATED ORAL 3 TIMES DAILY PRN
Qty: 45 TABLET | Refills: 0 | Status: SHIPPED | OUTPATIENT
Start: 2019-05-29 | End: 2019-06-17 | Stop reason: SDUPTHER

## 2019-06-17 DIAGNOSIS — E73.9 LACTOSE INTOLERANCE: ICD-10-CM

## 2019-06-17 DIAGNOSIS — F33.1 MDD (MAJOR DEPRESSIVE DISORDER), RECURRENT EPISODE, MODERATE (HCC): ICD-10-CM

## 2019-06-17 DIAGNOSIS — F41.1 GAD (GENERALIZED ANXIETY DISORDER): ICD-10-CM

## 2019-06-17 DIAGNOSIS — K21.9 GASTROESOPHAGEAL REFLUX DISEASE WITHOUT ESOPHAGITIS: ICD-10-CM

## 2019-06-17 RX ORDER — ARIPIPRAZOLE 2 MG/1
2 TABLET ORAL EVERY MORNING
Qty: 30 TABLET | Refills: 1 | Status: SHIPPED | OUTPATIENT
Start: 2019-06-17 | End: 2019-09-10 | Stop reason: SDUPTHER

## 2019-06-17 RX ORDER — HYDROXYZINE HYDROCHLORIDE 10 MG/1
10 TABLET, FILM COATED ORAL 3 TIMES DAILY PRN
Qty: 45 TABLET | Refills: 0 | Status: SHIPPED | OUTPATIENT
Start: 2019-06-17 | End: 2019-07-15 | Stop reason: SDUPTHER

## 2019-07-15 DIAGNOSIS — E73.9 LACTOSE INTOLERANCE: ICD-10-CM

## 2019-07-15 DIAGNOSIS — F41.1 GAD (GENERALIZED ANXIETY DISORDER): ICD-10-CM

## 2019-07-15 DIAGNOSIS — F33.1 MDD (MAJOR DEPRESSIVE DISORDER), RECURRENT EPISODE, MODERATE (HCC): ICD-10-CM

## 2019-07-15 DIAGNOSIS — K21.9 GASTROESOPHAGEAL REFLUX DISEASE WITHOUT ESOPHAGITIS: ICD-10-CM

## 2019-07-15 RX ORDER — ESCITALOPRAM OXALATE 10 MG/1
10 TABLET ORAL DAILY
Qty: 30 TABLET | Refills: 1 | Status: SHIPPED | OUTPATIENT
Start: 2019-07-15 | End: 2019-09-10 | Stop reason: SDUPTHER

## 2019-07-15 RX ORDER — HYDROXYZINE HYDROCHLORIDE 10 MG/1
10 TABLET, FILM COATED ORAL 3 TIMES DAILY PRN
Qty: 45 TABLET | Refills: 0 | Status: SHIPPED | OUTPATIENT
Start: 2019-07-15 | End: 2019-08-13 | Stop reason: SDUPTHER

## 2019-07-25 ENCOUNTER — TRANSCRIBE ORDERS (OUTPATIENT)
Dept: ADMINISTRATIVE | Facility: HOSPITAL | Age: 49
End: 2019-07-25

## 2019-07-25 DIAGNOSIS — Z12.31 ENCOUNTER FOR SCREENING MAMMOGRAM FOR MALIGNANT NEOPLASM OF BREAST: Primary | ICD-10-CM

## 2019-08-13 DIAGNOSIS — F33.1 MDD (MAJOR DEPRESSIVE DISORDER), RECURRENT EPISODE, MODERATE (HCC): ICD-10-CM

## 2019-08-13 DIAGNOSIS — F41.1 GAD (GENERALIZED ANXIETY DISORDER): ICD-10-CM

## 2019-08-13 DIAGNOSIS — E73.9 LACTOSE INTOLERANCE: ICD-10-CM

## 2019-08-13 DIAGNOSIS — K21.9 GASTROESOPHAGEAL REFLUX DISEASE WITHOUT ESOPHAGITIS: ICD-10-CM

## 2019-08-13 RX ORDER — HYDROXYZINE HYDROCHLORIDE 10 MG/1
10 TABLET, FILM COATED ORAL 3 TIMES DAILY PRN
Qty: 45 TABLET | Refills: 0 | Status: SHIPPED | OUTPATIENT
Start: 2019-08-13 | End: 2019-09-10 | Stop reason: SDUPTHER

## 2019-09-10 ENCOUNTER — TELEMEDICINE (OUTPATIENT)
Dept: PSYCHIATRY | Facility: CLINIC | Age: 49
End: 2019-09-10

## 2019-09-10 VITALS
SYSTOLIC BLOOD PRESSURE: 114 MMHG | HEART RATE: 78 BPM | DIASTOLIC BLOOD PRESSURE: 83 MMHG | BODY MASS INDEX: 37.7 KG/M2 | HEIGHT: 63 IN | WEIGHT: 212.8 LBS

## 2019-09-10 DIAGNOSIS — F41.1 GAD (GENERALIZED ANXIETY DISORDER): ICD-10-CM

## 2019-09-10 DIAGNOSIS — K21.9 GASTROESOPHAGEAL REFLUX DISEASE WITHOUT ESOPHAGITIS: ICD-10-CM

## 2019-09-10 DIAGNOSIS — E73.9 LACTOSE INTOLERANCE: ICD-10-CM

## 2019-09-10 DIAGNOSIS — F33.1 MDD (MAJOR DEPRESSIVE DISORDER), RECURRENT EPISODE, MODERATE (HCC): Primary | ICD-10-CM

## 2019-09-10 PROCEDURE — 99213 OFFICE O/P EST LOW 20 MIN: CPT | Performed by: NURSE PRACTITIONER

## 2019-09-10 RX ORDER — HYDROXYZINE HYDROCHLORIDE 10 MG/1
10 TABLET, FILM COATED ORAL 3 TIMES DAILY PRN
Qty: 45 TABLET | Refills: 2 | Status: SHIPPED | OUTPATIENT
Start: 2019-09-10 | End: 2019-12-16 | Stop reason: SDUPTHER

## 2019-09-10 RX ORDER — ESCITALOPRAM OXALATE 10 MG/1
15 TABLET ORAL DAILY
Qty: 45 TABLET | Refills: 2 | Status: SHIPPED | OUTPATIENT
Start: 2019-09-10 | End: 2019-12-16 | Stop reason: SDUPTHER

## 2019-09-10 RX ORDER — ARIPIPRAZOLE 2 MG/1
2 TABLET ORAL EVERY MORNING
Qty: 30 TABLET | Refills: 2 | Status: SHIPPED | OUTPATIENT
Start: 2019-09-10 | End: 2019-12-16 | Stop reason: SDUPTHER

## 2019-09-10 NOTE — TREATMENT PLAN
Multi-Disciplinary Problems (from Behavioral Health Treatment Plan)    Active Problems     Problem: Anxiety  Start Date: 09/10/19    Problem Details:  The patient self-scales this problem as a 5 with 10 being the worst.       Goal Priority Start Date Expected End Date End Date    Accepts need for medications -- 09/10/19 -- --    Goal Intervention Frequency Start Date End Date    Teach benefits and side effects of medication Q3 Months -- --    Goal Intervention Frequency Start Date End Date    Evaluate medication effectiveness and side effects Q3 Months -- --          Problem: Depression  Start Date: 09/10/19    Problem Details:  The patient self-scales this problem as a 4 with 10 being the worst.       Goal Priority Start Date Expected End Date End Date    Accepts need for medications -- 09/10/19 -- --    Goal Intervention Frequency Start Date End Date    Teach benefits and side effects of medication Q3 Months -- --    Goal Intervention Frequency Start Date End Date    Evaluate medication effectiveness and side effects Q3 Months -- --                       I have discussed and reviewed this treatment plan with the patient.  It has been printed for signatures.

## 2019-09-10 NOTE — PROGRESS NOTES
"  Amanda Domínguez is a 48 y.o. female is here today for medication management follow-up.    This provider is located at  [facility], [facility], [address] [place] [address] , The Patient  is seen remotely at [employer] [address] [address] using [employer].The patient’s condition being diagnosed/treated is appropriate for telemedicine. The provider identified him/herself as well as his or her credentials.   The patient and/or patients guardian consent to be seen remotely, and when consent is given they understand that the consent allows for patient identifiable information to be sent to a third party as needed.   They may refuse to be seen remotely at any time.The electronic data is encrypted and password protected, and the patient has been advised of the potential risks to privacy notwithstanding such measures.        Chief Complaint: Depression, Anxiety    History of Present Illness  She states that she has been taking her medications as prescribed with no SE or problems.  She rates her depression about 4/10 with 10 being the worse, she states that she stay real tired, she doesn't want to be active.  She states that her anxiety has continued, rates it about 5/10 with 10 being the worse, states that she gets \"really anxious\" that tends to stay with her all day.  She states that she feels on edge and worried about things.  She states that her sleep has been good, she is getting about 8-9 hours per night with no NM.  Appetite has been good with stable weight.  She states that she is not really stressed out about anything but stays that her mother had knee surgery a couple of months ago and she had to stay with her during that time, she states that she doesn't like change and it was difficult for her- she is now back home.  Denies any new health issues.  Denies any AV hallucinations, denies any SI/HI.      The following portions of the patient's history were reviewed " "and updated as appropriate: allergies, current medications, past family history, past medical history, past social history, past surgical history and problem list.    Review of Systems   Constitutional: Negative for appetite change, chills, diaphoresis, fatigue, fever and unexpected weight change.   HENT: Negative for hearing loss, sore throat, trouble swallowing and voice change.    Eyes: Negative for photophobia and visual disturbance.   Respiratory: Negative for cough, chest tightness and shortness of breath.    Cardiovascular: Negative for chest pain and palpitations.   Gastrointestinal: Negative for abdominal pain, constipation, nausea and vomiting.   Endocrine: Negative for cold intolerance and heat intolerance.   Genitourinary: Negative for dysuria and frequency.   Musculoskeletal: Negative for arthralgias, back pain, joint swelling and neck stiffness.   Skin: Negative for color change and wound.   Allergic/Immunologic: Negative for environmental allergies and immunocompromised state.   Neurological: Negative for dizziness, tremors, seizures, syncope, weakness, light-headedness and headaches.   Hematological: Negative for adenopathy. Does not bruise/bleed easily.       Objective   Physical Exam   Constitutional: She appears well-developed and well-nourished. No distress.   Neurological: She is alert. Coordination and gait normal.   Vitals reviewed.    Blood pressure 114/83, pulse 78, height 160 cm (62.99\"), weight 96.5 kg (212 lb 12.8 oz). Body mass index is 37.71 kg/m².    Medication List:   Current Outpatient Medications   Medication Sig Dispense Refill   • ARIPiprazole (ABILIFY) 2 MG tablet Take 1 tablet by mouth Every Morning. 30 tablet 2   • celecoxib (CeleBREX) 200 MG capsule TAKE 1 CAPSULE BY MOUTH TWICE DAILY WITH FOOD  0   • Cholecalciferol (VITAMIN D3) 1000 units capsule Vitamin D3 2,000 unit tablet   Take 1 tablet every day by oral route.     • escitalopram (LEXAPRO) 10 MG tablet Take 1.5 tablets " by mouth Daily. 45 tablet 2   • estradiol (ESTRACE) 0.5 MG tablet estradiol 0.5 mg tablet   take 1 tablet by mouth once daily     • fluticasone (FLONASE) 50 MCG/ACT nasal spray fluticasone 50 mcg/actuation nasal spray,suspension   Spray 1 spray every day by nasal route.     • gabapentin (NEURONTIN) 100 MG capsule   0   • hydrOXYzine (ATARAX) 10 MG tablet Take 1 tablet by mouth 3 (Three) Times a Day As Needed for Anxiety. 45 tablet 2   • lansoprazole (PREVACID) 30 MG capsule   1     No current facility-administered medications for this visit.        Mental Status Exam:   Hygiene:   fair  Cooperation:  Cooperative  Eye Contact:  Fair  Psychomotor Behavior:  Slow  Affect:  Blunted  Hopelessness: Denies  Speech:  Monotone  Thought Process:  Goal directed and Linear  Thought Content:  Mood congruent  Suicidal:  None  Homicidal:  None  Hallucinations:  None  Delusion:  None  Memory:  Intact  Orientation:  Person, Place, Time and Situation  Reliability:  fair  Insight:  Fair  Judgement:  Fair  Impulse Control:  Fair  Physical/Medical Issues:  No     Assessment/Plan   Problems Addressed this Visit     None      Visit Diagnoses     MDD (major depressive disorder), recurrent episode, moderate (CMS/HCC)    -  Primary    Relevant Medications    ARIPiprazole (ABILIFY) 2 MG tablet    escitalopram (LEXAPRO) 10 MG tablet    hydrOXYzine (ATARAX) 10 MG tablet    MAU (generalized anxiety disorder)        Relevant Medications    ARIPiprazole (ABILIFY) 2 MG tablet    escitalopram (LEXAPRO) 10 MG tablet    hydrOXYzine (ATARAX) 10 MG tablet    Lactose intolerance        Relevant Medications    ARIPiprazole (ABILIFY) 2 MG tablet    escitalopram (LEXAPRO) 10 MG tablet    hydrOXYzine (ATARAX) 10 MG tablet    Gastroesophageal reflux disease without esophagitis        Relevant Medications    ARIPiprazole (ABILIFY) 2 MG tablet    escitalopram (LEXAPRO) 10 MG tablet    hydrOXYzine (ATARAX) 10 MG tablet        Discussed medication options.  Continue Abilify for mood, increase lexapro for depression and anxiety, and maintain hydroxyzine for as needed anxiety. Reviewed the risks, benefits, and side effects of the medications; patient acknowledged and verbally consented.  Patient is agreeable to call the [facility]. Patient is aware to call 911 or go to the nearest ER should begin having SI/HI.     Prognosis: Guarded dependent on medication, follow up appointment and treatment plan compliance     Functionality: Fair. Depression seems to be impacting her motivation and energy levels causing it to be difficult getting her ADLs completed.    Return in 12 weeks    Treatment plan completed

## 2019-12-16 DIAGNOSIS — K21.9 GASTROESOPHAGEAL REFLUX DISEASE WITHOUT ESOPHAGITIS: ICD-10-CM

## 2019-12-16 DIAGNOSIS — F41.1 GAD (GENERALIZED ANXIETY DISORDER): ICD-10-CM

## 2019-12-16 DIAGNOSIS — F33.1 MDD (MAJOR DEPRESSIVE DISORDER), RECURRENT EPISODE, MODERATE (HCC): ICD-10-CM

## 2019-12-16 DIAGNOSIS — E73.9 LACTOSE INTOLERANCE: ICD-10-CM

## 2019-12-16 RX ORDER — ESCITALOPRAM OXALATE 10 MG/1
15 TABLET ORAL DAILY
Qty: 45 TABLET | Refills: 2 | Status: SHIPPED | OUTPATIENT
Start: 2019-12-16 | End: 2020-01-21 | Stop reason: SDUPTHER

## 2019-12-16 RX ORDER — ARIPIPRAZOLE 2 MG/1
2 TABLET ORAL EVERY MORNING
Qty: 30 TABLET | Refills: 2 | Status: SHIPPED | OUTPATIENT
Start: 2019-12-16 | End: 2020-01-21 | Stop reason: SDUPTHER

## 2019-12-16 RX ORDER — HYDROXYZINE HYDROCHLORIDE 10 MG/1
10 TABLET, FILM COATED ORAL 3 TIMES DAILY PRN
Qty: 45 TABLET | Refills: 2 | Status: SHIPPED | OUTPATIENT
Start: 2019-12-16 | End: 2020-01-21 | Stop reason: SDUPTHER

## 2020-01-21 ENCOUNTER — TELEMEDICINE (OUTPATIENT)
Dept: PSYCHIATRY | Facility: CLINIC | Age: 50
End: 2020-01-21

## 2020-01-21 VITALS
HEART RATE: 74 BPM | BODY MASS INDEX: 38.62 KG/M2 | WEIGHT: 218 LBS | DIASTOLIC BLOOD PRESSURE: 84 MMHG | SYSTOLIC BLOOD PRESSURE: 141 MMHG | HEIGHT: 63 IN

## 2020-01-21 DIAGNOSIS — K21.9 GASTROESOPHAGEAL REFLUX DISEASE WITHOUT ESOPHAGITIS: ICD-10-CM

## 2020-01-21 DIAGNOSIS — E73.9 LACTOSE INTOLERANCE: ICD-10-CM

## 2020-01-21 DIAGNOSIS — F41.1 GAD (GENERALIZED ANXIETY DISORDER): ICD-10-CM

## 2020-01-21 DIAGNOSIS — F33.1 MDD (MAJOR DEPRESSIVE DISORDER), RECURRENT EPISODE, MODERATE (HCC): Primary | ICD-10-CM

## 2020-01-21 PROCEDURE — 99213 OFFICE O/P EST LOW 20 MIN: CPT | Performed by: NURSE PRACTITIONER

## 2020-01-21 PROCEDURE — 90833 PSYTX W PT W E/M 30 MIN: CPT | Performed by: NURSE PRACTITIONER

## 2020-01-21 RX ORDER — ESCITALOPRAM OXALATE 10 MG/1
10 TABLET ORAL DAILY
Qty: 30 TABLET | Refills: 1 | Status: SHIPPED | OUTPATIENT
Start: 2020-01-21 | End: 2020-03-17 | Stop reason: SDUPTHER

## 2020-01-21 RX ORDER — ARIPIPRAZOLE 5 MG/1
5 TABLET ORAL EVERY MORNING
Qty: 30 TABLET | Refills: 1 | Status: SHIPPED | OUTPATIENT
Start: 2020-01-21 | End: 2020-03-17 | Stop reason: SDUPTHER

## 2020-01-21 RX ORDER — HYDROXYZINE HYDROCHLORIDE 25 MG/1
25 TABLET, FILM COATED ORAL 3 TIMES DAILY PRN
Qty: 90 TABLET | Refills: 1 | Status: SHIPPED | OUTPATIENT
Start: 2020-01-21 | End: 2020-03-17 | Stop reason: SDUPTHER

## 2020-01-21 NOTE — PROGRESS NOTES
"  Amanda Domínguez is a 49 y.o. female is here today for medication management follow-up.    This provider is located at  [facility], [facility], [address] [place] [address] , The Patient  is seen remotely at [employer] [address] [address] using [employer].The patient’s condition being diagnosed/treated is appropriate for telemedicine. The provider identified him/herself as well as his or her credentials.   The patient and/or patients guardian consent to be seen remotely, and when consent is given they understand that the consent allows for patient identifiable information to be sent to a third party as needed.   They may refuse to be seen remotely at any time.The electronic data is encrypted and password protected, and the patient has been advised of the potential risks to privacy notwithstanding such measures.        Chief Complaint: Depression, Anxiety    History of Present Illness  She states that she is doing ok, she \"guesses\".  She states that she feels like something has changed, she is not talking a lot and her family wonders what is going on.  She feels like she is slow moving, not functioning a lot.  She states that she is taking all of her medications; she denies any SE or problems with them.  She states that she is feeling sad \"some\", she rates it about 6/10 with 10 being the worse.  She states that she is also having problems with her anxiety, rates it about 6/10 with 10 being the worse.  She states that she is smothering, can't get her breathe.  She is getting between 8-10 hours of sleep per night with no NM.  Appetite has been good with slight weight gain of about 6 pounds over the Holidays.  She states that her  had to have open heart surgery back in November and is doing well overall.  Denies any recent health issues.  Denies any AV hallucinations, denies any SI/HI.  She states that she has tried to increase the lexapro but it made her " "worse, therefore will try to increase the abilify.  May look at changing the lexapro in the spring.  Also, she states that she is shaking quite a bit with anxiety; will increase the atarax as tablet with the patient being able to cut it in half if too much.  Also, recommended sun lamp with 300 lumens.     The following portions of the patient's history were reviewed and updated as appropriate: allergies, current medications, past family history, past medical history, past social history, past surgical history and problem list.    Review of Systems   Constitutional: Negative for appetite change, chills, diaphoresis, fatigue, fever and unexpected weight change.   HENT: Negative for hearing loss, sore throat, trouble swallowing and voice change.    Eyes: Negative for photophobia and visual disturbance.   Respiratory: Negative for cough, chest tightness and shortness of breath.    Cardiovascular: Negative for chest pain and palpitations.   Gastrointestinal: Negative for abdominal pain, constipation, nausea and vomiting.   Endocrine: Negative for cold intolerance and heat intolerance.   Genitourinary: Negative for dysuria and frequency.   Musculoskeletal: Negative for arthralgias, back pain, joint swelling and neck stiffness.   Skin: Negative for color change and wound.   Allergic/Immunologic: Negative for environmental allergies and immunocompromised state.   Neurological: Negative for dizziness, tremors, seizures, syncope, weakness, light-headedness and headaches.   Hematological: Negative for adenopathy. Does not bruise/bleed easily.       Objective   Physical Exam   Constitutional: She appears well-developed and well-nourished. No distress.   Neurological: She is alert. Coordination and gait normal.   Vitals reviewed.    Blood pressure 141/84, pulse 74, height 160 cm (62.99\"), weight 98.9 kg (218 lb). Body mass index is 38.63 kg/m².    Medication List:   Current Outpatient Medications   Medication Sig Dispense Refill "   • ARIPiprazole (ABILIFY) 5 MG tablet Take 1 tablet by mouth Every Morning. 30 tablet 1   • celecoxib (CeleBREX) 200 MG capsule TAKE 1 CAPSULE BY MOUTH TWICE DAILY WITH FOOD  0   • Cholecalciferol (VITAMIN D3) 1000 units capsule Vitamin D3 2,000 unit tablet   Take 1 tablet every day by oral route.     • escitalopram (LEXAPRO) 10 MG tablet Take 1 tablet by mouth Daily. 30 tablet 1   • estradiol (ESTRACE) 0.5 MG tablet estradiol 0.5 mg tablet   take 1 tablet by mouth once daily     • fluticasone (FLONASE) 50 MCG/ACT nasal spray fluticasone 50 mcg/actuation nasal spray,suspension   Spray 1 spray every day by nasal route.     • gabapentin (NEURONTIN) 100 MG capsule   0   • hydrOXYzine (ATARAX) 25 MG tablet Take 1 tablet by mouth 3 (Three) Times a Day As Needed for Anxiety. 90 tablet 1   • lansoprazole (PREVACID) 30 MG capsule   1     No current facility-administered medications for this visit.        Mental Status Exam:   Hygiene:   fair  Cooperation:  Cooperative  Eye Contact:  Fair  Psychomotor Behavior:  Slow  Affect:  Blunted  Hopelessness: Denies  Speech:  Monotone  Thought Process:  Goal directed and Linear  Thought Content:  Mood congruent  Suicidal:  None  Homicidal:  None  Hallucinations:  None  Delusion:  None  Memory:  Intact  Orientation:  Person, Place, Time and Situation  Reliability:  fair  Insight:  Fair  Judgement:  Fair  Impulse Control:  Fair  Physical/Medical Issues:  No     Assessment/Plan   Problems Addressed this Visit     None      Visit Diagnoses     MDD (major depressive disorder), recurrent episode, moderate (CMS/HCC)    -  Primary    Relevant Medications    ARIPiprazole (ABILIFY) 5 MG tablet    escitalopram (LEXAPRO) 10 MG tablet    hydrOXYzine (ATARAX) 25 MG tablet    MAU (generalized anxiety disorder)        Relevant Medications    ARIPiprazole (ABILIFY) 5 MG tablet    escitalopram (LEXAPRO) 10 MG tablet    hydrOXYzine (ATARAX) 25 MG tablet    Lactose intolerance        Relevant  Medications    ARIPiprazole (ABILIFY) 5 MG tablet    escitalopram (LEXAPRO) 10 MG tablet    hydrOXYzine (ATARAX) 25 MG tablet    Gastroesophageal reflux disease without esophagitis        Relevant Medications    ARIPiprazole (ABILIFY) 5 MG tablet    escitalopram (LEXAPRO) 10 MG tablet    hydrOXYzine (ATARAX) 25 MG tablet        Discussed medication options. Continue Abilify for mood, increase lexapro for depression and anxiety, and maintain hydroxyzine for as needed anxiety. Reviewed the risks, benefits, and side effects of the medications; patient acknowledged and verbally consented.  Patient is agreeable to call the [facility]. Patient is aware to call 911 or go to the nearest ER should begin having SI/HI.     Prognosis: Guarded dependent on medication, follow up appointment and treatment plan compliance     Functionality: Fair. Depression seems to be impacting her motivation and energy levels causing it to be difficult getting her ADLs completed.    Start Time: 1030a   Stop Time: 1050a  20 minutes face to face with patient was spent for psychotherapy. Assisted patient in processing patient’s MDD, and anxiety.  Acknowledged and normalized patient’s thoughts, feelings, and concerns. Utilized cognitive behavioral therapy to assist the patient in recognizing more appropriate coping mechanisms when she becomes agitated/anxious which are proven effective in reducing the severity of frequency of symptoms. Strongly urged to continue to eat better balanced and healthier foods in reducing further health risks. Allowed patient to freely discuss issues without interruption or judgment.    Return in 8 weeks    Completed PHQ 9 and [facility]    Treatment plan completed 9/10/19

## 2020-03-17 DIAGNOSIS — K21.9 GASTROESOPHAGEAL REFLUX DISEASE WITHOUT ESOPHAGITIS: ICD-10-CM

## 2020-03-17 DIAGNOSIS — F41.1 GAD (GENERALIZED ANXIETY DISORDER): ICD-10-CM

## 2020-03-17 DIAGNOSIS — F33.1 MDD (MAJOR DEPRESSIVE DISORDER), RECURRENT EPISODE, MODERATE (HCC): ICD-10-CM

## 2020-03-17 DIAGNOSIS — E73.9 LACTOSE INTOLERANCE: ICD-10-CM

## 2020-03-17 RX ORDER — ESCITALOPRAM OXALATE 10 MG/1
10 TABLET ORAL DAILY
Qty: 30 TABLET | Refills: 1 | Status: SHIPPED | OUTPATIENT
Start: 2020-03-17 | End: 2020-04-14 | Stop reason: SDUPTHER

## 2020-03-17 RX ORDER — HYDROXYZINE HYDROCHLORIDE 25 MG/1
25 TABLET, FILM COATED ORAL 3 TIMES DAILY PRN
Qty: 90 TABLET | Refills: 1 | Status: SHIPPED | OUTPATIENT
Start: 2020-03-17 | End: 2020-04-14 | Stop reason: SDUPTHER

## 2020-03-17 RX ORDER — ARIPIPRAZOLE 5 MG/1
5 TABLET ORAL EVERY MORNING
Qty: 30 TABLET | Refills: 1 | Status: SHIPPED | OUTPATIENT
Start: 2020-03-17 | End: 2020-04-14 | Stop reason: SDUPTHER

## 2020-04-14 ENCOUNTER — TELEMEDICINE (OUTPATIENT)
Dept: PSYCHIATRY | Facility: CLINIC | Age: 50
End: 2020-04-14

## 2020-04-14 DIAGNOSIS — F41.1 GAD (GENERALIZED ANXIETY DISORDER): ICD-10-CM

## 2020-04-14 DIAGNOSIS — F33.1 MDD (MAJOR DEPRESSIVE DISORDER), RECURRENT EPISODE, MODERATE (HCC): Primary | ICD-10-CM

## 2020-04-14 PROCEDURE — 90792 PSYCH DIAG EVAL W/MED SRVCS: CPT | Performed by: NURSE PRACTITIONER

## 2020-04-14 RX ORDER — HYDROXYZINE HYDROCHLORIDE 25 MG/1
25 TABLET, FILM COATED ORAL 3 TIMES DAILY PRN
Qty: 90 TABLET | Refills: 1 | Status: SHIPPED | OUTPATIENT
Start: 2020-04-14 | End: 2020-06-15 | Stop reason: SDUPTHER

## 2020-04-14 RX ORDER — ESCITALOPRAM OXALATE 10 MG/1
10 TABLET ORAL DAILY
Qty: 30 TABLET | Refills: 1 | Status: SHIPPED | OUTPATIENT
Start: 2020-04-14 | End: 2020-06-15 | Stop reason: SDUPTHER

## 2020-04-14 RX ORDER — ARIPIPRAZOLE 2 MG/1
2 TABLET ORAL EVERY MORNING
Qty: 30 TABLET | Refills: 1 | Status: SHIPPED | OUTPATIENT
Start: 2020-04-14 | End: 2020-06-15 | Stop reason: SDUPTHER

## 2020-04-14 NOTE — PROGRESS NOTES
"This provider is located at [facility], [address] [place] [address], [facility], [id number] using a telephone in a secure private environment. The Patient is seen remotely at their home address in [place], using a private telephone.  The patient is unable to be seen through a MyChart Video Visit through [facility] at today's encounter because the patient does not have a device to use for a video visit, therefore a telephone encounter was conducted. Patient is being evaluated/treated via telehealth by telephone, and stated they are in a secure environment for this session. The patient's condition being diagnosed/treated is appropriate for telemedicine. The provider identified herself as well as her credentials.   The patient, and/or patient's guardian, consent to be seen remotely, and when consent is given they understand that the consent allows for patient identifiable information to be sent to a third party as needed.   They may refuse to be seen remotely at any time. The electronic data is encrypted and password protected, and the patient and/or guardian has been advised of the potential risks to privacy not withstanding such measures.    You have chosen to receive care through a telephone visit. Do you consent to use a telephone visit for your medical care today? Yes  This visit has been rescheduled as a phone visit to comply with patient safety concerns in accordance with CDC recommendations.          Subjective   Sally Domínguez is a 49 y.o. female who presents today for Medication Management    Chief Complaint:  Depression and anxiety    History of Present Illness:  Accompanied by: The patient reports she is alone at today's visit.  This is the first encounter for this APRN with this patient.  The patient came to this APRN on her current medication regimen.  The patient describes her mood as \"okay\" over the last few weeks.  She has been feeling pretty good and states she has been handling the self quarantining with the " coronavirus epidemic fine.  The patient reports her appetite as good.  The patient reports her sleep as good.  The patient denies any nightmares or bad dreams.  She states she averages about 10 hours of sleep per night.  The patient denies any new medical problems or changes in medications since last appointment with this facility.  The patient reports compliance with current medication regimen.  The patient denies any current side effects from her current medication regimen.  The patient denies any abnormal muscle movements or tics.  The patient rates her depression at a 5/10 on a 0-10 scale, with 10 being the worst.  The patient rates her anxiety at a 5/10 on a 0-10 scale, with 10 being the worst.  The patient rates hopelessness at a 0/10 on a 0-10 scale with 10 being the worst.  The patient would like to not adjust or change her medications at this visit.  She states she is having one problem with her medicine, she states the previous APRN told her to cut her Abilify 5 mg tablet in half, and when she does this it crumbles it to pieces and makes it hard to take.  She has still been taking it, but wonders if there is a different dose she could take and not have to cut.  The patient denies suicidal ideations and homicidal ideations, and is convincing.  The patient denies any auditory hallucinations or visual hallucinations.      Last Menstrual Period:  Hysterectomy    The following portions of the patient's history were reviewed and updated as appropriate: allergies, current medications, past family history, past medical history, past social history, past surgical history and problem list.      Past Medical History:  Past Medical History:   Diagnosis Date   • Chronic low back pain    • Diverticulosis    • GERD (gastroesophageal reflux disease)        Social History:  Social History     Socioeconomic History   • Marital status: Unknown     Spouse name: Not on file   • Number of children: Not on file   • Years of  education: Not on file   • Highest education level: Not on file   Tobacco Use   • Smoking status: Never Smoker   • Smokeless tobacco: Never Used   Substance and Sexual Activity   • Alcohol use: No   • Drug use: No   • Sexual activity: Yes     Partners: Male       Family History:  Family History   Problem Relation Age of Onset   • Depression Mother    • Depression Brother    • Schizophrenia Maternal Grandmother        Past Surgical History:  Past Surgical History:   Procedure Laterality Date   • CHOLECYSTECTOMY     • HYSTERECTOMY         Problem List:  There is no problem list on file for this patient.      Allergy:   No Known Allergies     Current Medications:   Current Outpatient Medications   Medication Sig Dispense Refill   • ARIPiprazole (ABILIFY) 2 MG tablet Take 1 tablet by mouth Every Morning. 30 tablet 1   • celecoxib (CeleBREX) 200 MG capsule TAKE 1 CAPSULE BY MOUTH TWICE DAILY WITH FOOD  0   • Cholecalciferol (VITAMIN D3) 1000 units capsule Vitamin D3 2,000 unit tablet   Take 1 tablet every day by oral route.     • escitalopram (LEXAPRO) 10 MG tablet Take 1 tablet by mouth Daily. 30 tablet 1   • estradiol (ESTRACE) 0.5 MG tablet estradiol 0.5 mg tablet   take 1 tablet by mouth once daily     • fluticasone (FLONASE) 50 MCG/ACT nasal spray fluticasone 50 mcg/actuation nasal spray,suspension   Spray 1 spray every day by nasal route.     • gabapentin (NEURONTIN) 100 MG capsule   0   • hydrOXYzine (ATARAX) 25 MG tablet Take 1 tablet by mouth 3 (Three) Times a Day As Needed for Anxiety. 90 tablet 1   • lansoprazole (PREVACID) 30 MG capsule   1     No current facility-administered medications for this visit.        Review of Symptoms:    Review of Systems   Constitutional: Negative.    HENT: Negative.    Eyes: Negative.    Respiratory: Negative.    Cardiovascular: Negative.    Gastrointestinal: Negative.    Endocrine: Negative.    Genitourinary: Negative.    Musculoskeletal: Negative.    Skin: Negative.     Neurological: Negative.    Psychiatric/Behavioral: Positive for decreased concentration, depressed mood and stress. Negative for agitation, behavioral problems, dysphoric mood, hallucinations, self-injury, sleep disturbance, suicidal ideas and negative for hyperactivity. The patient is nervous/anxious.          Physical Exam:   Physical Exam   Constitutional: She is oriented to person, place, and time.   Neurological: She is alert and oriented to person, place, and time.   Psychiatric: Her speech is normal. She expresses no homicidal and no suicidal ideation. She expresses no suicidal plans and no homicidal plans.       There were no vitals taken for this visit. There is no height or weight on file to calculate BMI.  Due to extenuating circumstances and possible current health risks associated with the patient being present in a clinical setting (with current health restrictions in place in regards to possible COVID 19 transmission/exposure), the patient was seen remotely today via a telephone encounter.  Unable to obtain vital signs due to nature of remote visit.  Height stated at 63 inches.  Weight stated at 218 pounds.      Previous available Provider Notes and medical records reviewed by this APRN at today's visit.      Mental Status Exam:   Hygiene:   Unable to evaluate due to type of encounter/visit -telephone encounter/visit  Cooperation:  Cooperative  Eye Contact:  Unable to evaluate due to type of encounter/visit -telephone encounter/visit  Psychomotor Behavior:  Unable to evaluate due to type of encounter/visit -telephone encounter/visit  Affect:  Unable to evaluate due to type of encounter/visit -telephone encounter/visit  Mood: euthymic  Hopelessness: Denies  Speech:  Normal  Thought Process:  Goal directed and Linear  Thought Content:  Normal and Mood congruent  Suicidal:  None  Homicidal:  None  Hallucinations:  None  Delusion:  None  Memory:  Intact  Orientation:  Person, Place, Time and  Situation  Reliability:  fair  Insight:  Fair  Judgement:  Fair  Impulse Control:  Fair  Physical/Medical Issues:  No        Lab Results:   No visits with results within 1 Month(s) from this visit.   Latest known visit with results is:   No results found for any previous visit.       Assessment/Plan   Problems Addressed this Visit     None      Visit Diagnoses     MDD (major depressive disorder), recurrent episode, moderate (CMS/HCC)    -  Primary    Relevant Medications    hydrOXYzine (ATARAX) 25 MG tablet    escitalopram (LEXAPRO) 10 MG tablet    ARIPiprazole (ABILIFY) 2 MG tablet    MAU (generalized anxiety disorder)        Relevant Medications    hydrOXYzine (ATARAX) 25 MG tablet    escitalopram (LEXAPRO) 10 MG tablet    ARIPiprazole (ABILIFY) 2 MG tablet          Visit Diagnoses:    ICD-10-CM ICD-9-CM   1. MDD (major depressive disorder), recurrent episode, moderate (CMS/HCC) F33.1 296.32   2. MAU (generalized anxiety disorder) F41.1 300.02         GOALS:  Short Term Goals: Patient will be compliant with medication, and patient will have no significant medication related side effects.  Patient will be engaged in psychotherapy as indicated.  Patient will report subjective improvement of symptoms.  Long term goals: To stabilize mood and treat/improve subjective symptoms, the patient will stay out of the hospital, the patient will be at an optimal level of functioning, and the patient will take all medications as prescribed.  The patient verbalized understanding and agreement with goals that were mutually set.      TREATMENT PLAN: Continue supportive psychotherapy efforts and medications as indicated.  Continue Lexapro 10 mg by mouth once daily for symptoms of depression and anxiety.  Continue hydroxyzine 25 mg by mouth 3 times daily as needed for symptoms of anxiety (the patient reports that she typically only takes this about once a day).  The patient reports that she has been splitting a 5 mg Abilify tablet in  half every day, she states that the tablet crumbles and it makes it hard to take this medication, so the dosage of Abilify has been changed for the patient to take Abilify 2 mg by mouth once daily for mood and adjunct for depression.  Pharmacological and Non-Pharmacological treatment options discussed during today's visit, including off label usage of medication. Patient/Guardian acknowledged and verbally consented with current treatment plan and was educated on the importance of compliance with treatment and follow-up appointments.      MEDICATION ISSUES:  Discussed medication options and treatment plan of prescribed medication, any off label use of medication, as well as the risks, benefits, any black box warnings including increased suicidality, and side effects including but not limited to potential falls, dizziness, possible impaired driving, GI side effects (change in appetite, abdominal discomfort, nausea, vomiting, diarrhea, and/or constipation), dry mouth, somnolence, sedation, insomnia, activation, agitation, irritation, tremors, abnormal muscle movements or disorders, tardive dyskinesia, akathisia, asthenia, headache, sweating, possible bruising or rare bleeding, electrolyte and/or fluid abnormalities, change in blood pressure/heart rate/and or heart rhythm, hypotension, sexual dysfunction, rare impulse control problems, rare seizures, rare neuroleptic malignant syndrome, increased risk of death and cerebrovascular events, change in blood glucose and increased risk for diabetes, change in triglycerides and cholesterol and increased risk for dyslipidemia,  weight gain, weight gain that can become problematic to health, skin conditions and reactions, and metabolic adversities among others. Patient and/or guardian are agreeable to call the office with any worsening of symptoms or onset of side effects, or if any concerns or questions arise.  The contact information for the office is made available to the  patient and/or guardian. Patient and/or guardian are agreeable to call 911 or go to the nearest ER should they begin having any SI/HI, or if any urgent concerns arise. No medication side effects or related complaints today.      MEDS ORDERED DURING VISIT:  New Medications Ordered This Visit   Medications   • hydrOXYzine (ATARAX) 25 MG tablet     Sig: Take 1 tablet by mouth 3 (Three) Times a Day As Needed for Anxiety.     Dispense:  90 tablet     Refill:  1   • escitalopram (LEXAPRO) 10 MG tablet     Sig: Take 1 tablet by mouth Daily.     Dispense:  30 tablet     Refill:  1   • ARIPiprazole (ABILIFY) 2 MG tablet     Sig: Take 1 tablet by mouth Every Morning.     Dispense:  30 tablet     Refill:  1       Return in about 8 weeks (around 6/9/2020), or if symptoms worsen or fail to improve, for Recheck.         Progress toward goal: Not at goal    Functional Status: Mild impairment     Prognosis: Fair with Ongoing Treatment     Treatment plan completed 9/10/19.          This document has been electronically signed by KIM Rendon  April 14, 2020 13:47    Please note that portions of this note were completed with a voice recognition program. Efforts were made to edit dictation, but occasionally words are mistranscribed.

## 2020-06-15 DIAGNOSIS — F41.1 GAD (GENERALIZED ANXIETY DISORDER): ICD-10-CM

## 2020-06-15 DIAGNOSIS — F33.1 MDD (MAJOR DEPRESSIVE DISORDER), RECURRENT EPISODE, MODERATE (HCC): ICD-10-CM

## 2020-06-15 RX ORDER — ESCITALOPRAM OXALATE 10 MG/1
10 TABLET ORAL DAILY
Qty: 30 TABLET | Refills: 0 | Status: SHIPPED | OUTPATIENT
Start: 2020-06-15 | End: 2020-07-07 | Stop reason: SDUPTHER

## 2020-06-15 RX ORDER — ARIPIPRAZOLE 2 MG/1
2 TABLET ORAL EVERY MORNING
Qty: 30 TABLET | Refills: 0 | Status: SHIPPED | OUTPATIENT
Start: 2020-06-15 | End: 2020-07-07 | Stop reason: SDUPTHER

## 2020-06-15 RX ORDER — HYDROXYZINE HYDROCHLORIDE 25 MG/1
25 TABLET, FILM COATED ORAL 3 TIMES DAILY PRN
Qty: 90 TABLET | Refills: 0 | Status: SHIPPED | OUTPATIENT
Start: 2020-06-15 | End: 2020-07-07 | Stop reason: SDUPTHER

## 2020-06-15 NOTE — TELEPHONE ENCOUNTER
Please let patient known that I will send in a refill for the patient today to do her until her next appointment can be scheduled within the next month.  She missed her appointment last week.  She will need to be seen before further refills can be sent in.  Thanks.

## 2020-07-07 ENCOUNTER — TELEMEDICINE (OUTPATIENT)
Dept: PSYCHIATRY | Facility: CLINIC | Age: 50
End: 2020-07-07

## 2020-07-07 DIAGNOSIS — F33.1 MDD (MAJOR DEPRESSIVE DISORDER), RECURRENT EPISODE, MODERATE (HCC): Primary | ICD-10-CM

## 2020-07-07 DIAGNOSIS — F41.1 GAD (GENERALIZED ANXIETY DISORDER): ICD-10-CM

## 2020-07-07 PROCEDURE — 99213 OFFICE O/P EST LOW 20 MIN: CPT | Performed by: NURSE PRACTITIONER

## 2020-07-07 RX ORDER — HYDROXYZINE HYDROCHLORIDE 25 MG/1
25 TABLET, FILM COATED ORAL 3 TIMES DAILY PRN
Qty: 90 TABLET | Refills: 1 | Status: SHIPPED | OUTPATIENT
Start: 2020-07-07 | End: 2020-09-03 | Stop reason: SDUPTHER

## 2020-07-07 RX ORDER — ARIPIPRAZOLE 2 MG/1
2 TABLET ORAL EVERY MORNING
Qty: 30 TABLET | Refills: 1 | Status: SHIPPED | OUTPATIENT
Start: 2020-07-07 | End: 2020-09-03 | Stop reason: SDUPTHER

## 2020-07-07 RX ORDER — ESCITALOPRAM OXALATE 10 MG/1
10 TABLET ORAL DAILY
Qty: 30 TABLET | Refills: 1 | Status: SHIPPED | OUTPATIENT
Start: 2020-07-07 | End: 2020-09-03 | Stop reason: SDUPTHER

## 2020-07-07 NOTE — PROGRESS NOTES
"This provider is located at [facility], [address] [place] [address], [facility], [address] using a telephone in a secure private environment. The Patient is seen remotely at their home address in [place], using a private telephone.  The patient is unable to be seen through a MyChart Video Visit through [facility] at today's encounter because the patient does not have a device to use for a video visit, therefore a telephone encounter was conducted. Patient is being evaluated/treated via telehealth by telephone, and stated they are in a secure environment for this session. The patient's condition being diagnosed/treated is appropriate for telemedicine. The provider identified herself as well as her credentials.   The patient, and/or patient's guardian, consent to be seen remotely, and when consent is given they understand that the consent allows for patient identifiable information to be sent to a third party as needed.   They may refuse to be seen remotely at any time. The electronic data is encrypted and password protected, and the patient and/or guardian has been advised of the potential risks to privacy not withstanding such measures.      You have chosen to receive care through a telephone visit. Do you consent to use a telephone visit for your medical care today? Yes  This visit has been rescheduled as a phone visit to comply with patient safety concerns in accordance with CDC recommendations. Total time of discussion was 15 minutes.        Subjective   Sally Domínguez is a 49 y.o. female who presents today for Medication Management    Chief Complaint:  Depression and anxiety    History of Present Illness:  Accompanied by: The patient reports she is alone at today's visit.  The patient states that her moods have \"been doing okay\" since her last encounter with this APRN.  The patient rates her symptoms of depression at a 5 out of 10 on a 0-10 scale with 10 being the worst.  The patient also rates her symptoms of anxiety at a 5 out " of 10 on a 0-10 scale with 10 being the worst.  The patient states since the current coronavirus pandemic started she really has not been getting out and doing much, she states she usually just stays home and does things like watch TV.  She states her sleep has been good.  She states she wonders if she sleeps too much sometimes.  The patient states she averages approximately 8 to 10 hours of sleep per night.  She denies any nightmares.  She states sometimes she naps in the day, but not every day.  She states she does not feel like her sleep, or napping in the day, is related to depression, she states she thinks it is just her and how her normal is.  She states her appetite has been good.  The patient states she does not think she has gained or lost any weight, and has maintained her usual weight of 219 pounds.  The patient denies any changes in her medical history since her last encounter with his APRN.  The patient reports compliance with her current medication regimen.  She states she takes the as needed hydroxyzine about once per day.  She denies any known medication side effects or abnormal musculoskeletal movements.  The patient denies any auditory or visual hallucinations.  The patient denies any suicidal or homicidal ideations, plans, or intent at today's encounter and is convincing.  The patient does not exhibit any symptoms of hypomania, cristi, or psychosis at today's encounter.  The patient states she feels like she is doing pretty good on her current medication regimen, and the patient does not want to change her medications at today's encounter.      Last Menstrual Period:  Hysterectomy      The following portions of the patient's history were reviewed and updated as appropriate: allergies, current medications, past family history, past medical history, past social history, past surgical history and problem list.      Past Medical History:  Past Medical History:   Diagnosis Date   • Chronic low back pain     • Diverticulosis    • GERD (gastroesophageal reflux disease)        Social History:  Social History     Socioeconomic History   • Marital status: Unknown     Spouse name: Not on file   • Number of children: Not on file   • Years of education: Not on file   • Highest education level: Not on file   Tobacco Use   • Smoking status: Never Smoker   • Smokeless tobacco: Never Used   Substance and Sexual Activity   • Alcohol use: No   • Drug use: No   • Sexual activity: Yes     Partners: Male       Family History:  Family History   Problem Relation Age of Onset   • Depression Mother    • Depression Brother    • Schizophrenia Maternal Grandmother        Past Surgical History:  Past Surgical History:   Procedure Laterality Date   • CHOLECYSTECTOMY     • HYSTERECTOMY         Problem List:  There is no problem list on file for this patient.      Allergy:   No Known Allergies     Current Medications:   Current Outpatient Medications   Medication Sig Dispense Refill   • ARIPiprazole (ABILIFY) 2 MG tablet Take 1 tablet by mouth Every Morning. 30 tablet 1   • celecoxib (CeleBREX) 200 MG capsule TAKE 1 CAPSULE BY MOUTH TWICE DAILY WITH FOOD  0   • Cholecalciferol (VITAMIN D3) 1000 units capsule Vitamin D3 2,000 unit tablet   Take 1 tablet every day by oral route.     • escitalopram (LEXAPRO) 10 MG tablet Take 1 tablet by mouth Daily. 30 tablet 1   • estradiol (ESTRACE) 0.5 MG tablet estradiol 0.5 mg tablet   take 1 tablet by mouth once daily     • fluticasone (FLONASE) 50 MCG/ACT nasal spray fluticasone 50 mcg/actuation nasal spray,suspension   Spray 1 spray every day by nasal route.     • gabapentin (NEURONTIN) 100 MG capsule   0   • hydrOXYzine (ATARAX) 25 MG tablet Take 1 tablet by mouth 3 (Three) Times a Day As Needed for Anxiety. 90 tablet 1   • lansoprazole (PREVACID) 30 MG capsule   1     No current facility-administered medications for this visit.        Review of Symptoms:    Review of Systems   Constitutional: Negative.     HENT: Negative.    Eyes: Negative.    Respiratory: Negative.    Cardiovascular: Negative.    Gastrointestinal: Negative.    Endocrine: Negative.    Genitourinary: Negative.    Musculoskeletal: Negative.    Skin: Negative.    Neurological: Negative.    Psychiatric/Behavioral: Positive for decreased concentration, depressed mood and stress. Negative for agitation, behavioral problems, dysphoric mood, hallucinations, self-injury, sleep disturbance, suicidal ideas and negative for hyperactivity. The patient is nervous/anxious.          Physical Exam:   Physical Exam   Constitutional: She is oriented to person, place, and time.   Neurological: She is alert and oriented to person, place, and time.   Psychiatric: Her speech is normal. She expresses no homicidal and no suicidal ideation. She expresses no suicidal plans and no homicidal plans.       There were no vitals taken for this visit. There is no height or weight on file to calculate BMI.  Due to extenuating circumstances and possible current health risks associated with the patient being present in a clinical setting (with current health restrictions in place in regards to possible COVID 19 transmission/exposure), the patient was seen remotely today via a telephone encounter.  Unable to obtain vital signs due to nature of remote visit.  Height stated at 63 inches.  Weight stated at 218 pounds.      Mental Status Exam:   Hygiene:   Unable to evaluate due to type of encounter/visit -telephone encounter/visit  Cooperation:  Cooperative  Eye Contact:  Unable to evaluate due to type of encounter/visit -telephone encounter/visit  Psychomotor Behavior:  Unable to evaluate due to type of encounter/visit -telephone encounter/visit  Affect:  Unable to evaluate due to type of encounter/visit -telephone encounter/visit  Mood: normal  Hopelessness: Denies  Speech:  Normal  Thought Process:  Linear  Thought Content:  Normal  Suicidal:  None  Homicidal:  None  Hallucinations:   None  Delusion:  None  Memory:  Intact  Orientation:  Person, Place, Time and Situation  Reliability:  fair  Insight:  Fair  Judgement:  Fair  Impulse Control:  Fair  Physical/Medical Issues:  No          PHQ-2 Depression Screening  Little interest or pleasure in doing things? 2   Feeling down, depressed, or hopeless? 2   PHQ-2 Total Score 4         Lab Results:   No visits with results within 1 Month(s) from this visit.   Latest known visit with results is:   No results found for any previous visit.       Assessment/Plan   Problems Addressed this Visit     None      Visit Diagnoses     MDD (major depressive disorder), recurrent episode, moderate (CMS/HCC)    -  Primary    Relevant Medications    ARIPiprazole (ABILIFY) 2 MG tablet    escitalopram (LEXAPRO) 10 MG tablet    hydrOXYzine (ATARAX) 25 MG tablet    MAU (generalized anxiety disorder)        Relevant Medications    ARIPiprazole (ABILIFY) 2 MG tablet    escitalopram (LEXAPRO) 10 MG tablet    hydrOXYzine (ATARAX) 25 MG tablet          Visit Diagnoses:    ICD-10-CM ICD-9-CM   1. MDD (major depressive disorder), recurrent episode, moderate (CMS/HCC) F33.1 296.32   2. MAU (generalized anxiety disorder) F41.1 300.02         GOALS:  Short Term Goals: Patient will be compliant with medication, and patient will have no significant medication related side effects.  Patient will be engaged in psychotherapy as indicated.  Patient will report subjective improvement of symptoms.  Long term goals: To stabilize mood and treat/improve subjective symptoms, the patient will stay out of the hospital, the patient will be at an optimal level of functioning, and the patient will take all medications as prescribed.  The patient verbalized understanding and agreement with goals that were mutually set.      TREATMENT PLAN: Continue supportive psychotherapy efforts and medications as indicated.  Continue Lexapro 10 mg by mouth once daily for symptoms of depression and anxiety.   Continue hydroxyzine 25 mg by mouth up to 3 times daily as needed for symptoms of anxiety (the patient reports that she typically only takes this about once a day).  Continue Abilify 2 mg by mouth once daily for mood and adjunct for depression.  Pharmacological and Non-Pharmacological treatment options discussed during today's visit, including off label usage of medication. Patient/Guardian acknowledged and verbally consented with current treatment plan and was educated on the importance of compliance with treatment and follow-up appointments.        MEDICATION ISSUES:  Discussed medication options and treatment plan of prescribed medication, any off label use of medication, as well as the risks, benefits, any black box warnings including increased suicidality, and side effects including but not limited to potential falls, dizziness, possible impaired driving, GI side effects (change in appetite, abdominal discomfort, nausea, vomiting, diarrhea, and/or constipation), dry mouth, somnolence, sedation, insomnia, activation, agitation, irritation, tremors, abnormal muscle movements or disorders, tardive dyskinesia, akathisia, asthenia, headache, sweating, possible bruising or rare bleeding, electrolyte and/or fluid abnormalities, change in blood pressure/heart rate/and or heart rhythm, hypotension, sexual dysfunction, rare impulse control problems, rare seizures, rare neuroleptic malignant syndrome, increased risk of death and cerebrovascular events, change in blood glucose and increased risk for diabetes, change in triglycerides and cholesterol and increased risk for dyslipidemia,  weight gain, weight gain that can become problematic to health, skin conditions and reactions, and metabolic adversities among others. Patient and/or guardian are agreeable to call the office with any worsening of symptoms or onset of side effects, or if any concerns or questions arise.  The contact information for the office is made  available to the patient and/or guardian. Patient and/or guardian are agreeable to call 911 or go to the nearest ER should they begin having any SI/HI, or if any urgent concerns arise. No medication side effects or related complaints today.      SUICIDE RISK ASSESSMENT: Unalterable demographics and a history of mental health intervention indicate this patient is in a high risk category compared to the general population. At present, the patient denies active SI/HI, intentions, or plans at this time and agrees to seek immediate care should such thoughts develop. The patient verbalizes understanding of how to access emergency care if needed and agrees to do so. Consideration of suicide risk and protective factors such as history, current presentation, individual strengths and weaknesses, psychosocial and environmental stressors and variables, psychiatric illness and symptoms, medical conditions and pain, took place in this interview. Based on those considerations, the patient is determined: within individual baseline and presenting no imminent risk for suicide or homicide. Other recommendations: The patient does not meet the criteria for inpatient admission and is not a safety risk to self or others at today's visit. Inpatient treatment offers no significant advantages over outpatient treatment for this patient at today's visit.      SAFETY PLAN:  Patient was given ample time for questions and fully participated in treatment planning.  Patient was encouraged to call the clinic with any questions or concerns.  Patient was informed of access to emergency care. If patient were to develop any significant symptomatology, suicidal ideation, homicidal ideation, any concerns, or feel unsafe at any time they are to call the clinic and if unable to get immediate assistance should immediately call 911 or go to the nearest emergency room.  The patient is advised to remove or secure (lock away) all lethal weapons (including guns)  and sharps (including razors, scissors, knives, etc.).  All medications (including any prescribed and any over the counter medications) should be stored in a safe and secured location that is not obtainable by children/adolescents.  Patient was given an opportunity and encouraged to ask questions about their medication, illness, and treatment. Patient contracted verbally for the following: If you are experiencing an emotional crisis or have thoughts of harming yourself or others, please go to your nearest local emergency room or call 911. Will continue to re-assess medication response and side effects frequently to establish efficacy and ensure safety. Risks, any black box warnings, side effects, off label usage, and benefits of medication and treatment discussed with patient, along with potential adverse side effects of current and/or newly prescribed medication, alternative treatment options, and OTC medications.  Patient verbalized understanding of potential risks, any off label use of medication, any black box warnings, and any side effects in their own words. The patient verbalized understanding and agreed to comply with the safety plan discussed in their own words.  Patient given the number to the office. Number also available to the 24- hour suicide hotline.        MEDS ORDERED DURING VISIT:  New Medications Ordered This Visit   Medications   • ARIPiprazole (ABILIFY) 2 MG tablet     Sig: Take 1 tablet by mouth Every Morning.     Dispense:  30 tablet     Refill:  1   • escitalopram (LEXAPRO) 10 MG tablet     Sig: Take 1 tablet by mouth Daily.     Dispense:  30 tablet     Refill:  1   • hydrOXYzine (ATARAX) 25 MG tablet     Sig: Take 1 tablet by mouth 3 (Three) Times a Day As Needed for Anxiety.     Dispense:  90 tablet     Refill:  1       Return in about 8 weeks (around 9/1/2020), or if symptoms worsen or fail to improve, for Recheck.         Progress toward goal: Not at goal    Functional Status: Mild  impairment     Prognosis: Fair with Ongoing Treatment     Treatment plan completed 9/10/19.          This document has been electronically signed by KIM Marie  July 7, 2020 11:46    Please note that portions of this note were completed with a voice recognition program. Efforts were made to edit dictation, but occasionally words are mistranscribed.

## 2020-09-03 DIAGNOSIS — F41.1 GAD (GENERALIZED ANXIETY DISORDER): ICD-10-CM

## 2020-09-03 DIAGNOSIS — F33.1 MDD (MAJOR DEPRESSIVE DISORDER), RECURRENT EPISODE, MODERATE (HCC): ICD-10-CM

## 2020-09-03 RX ORDER — ARIPIPRAZOLE 2 MG/1
2 TABLET ORAL EVERY MORNING
Qty: 30 TABLET | Refills: 1 | Status: SHIPPED | OUTPATIENT
Start: 2020-09-03

## 2020-09-03 RX ORDER — HYDROXYZINE HYDROCHLORIDE 25 MG/1
25 TABLET, FILM COATED ORAL 3 TIMES DAILY PRN
Qty: 90 TABLET | Refills: 1 | Status: SHIPPED | OUTPATIENT
Start: 2020-09-03

## 2020-09-03 RX ORDER — ESCITALOPRAM OXALATE 10 MG/1
10 TABLET ORAL DAILY
Qty: 30 TABLET | Refills: 1 | Status: SHIPPED | OUTPATIENT
Start: 2020-09-03

## 2020-09-03 NOTE — TELEPHONE ENCOUNTER
Patient has no Internet so mychart is not an option she is going to ask her PCP can he start writing her meds since she is stable on them. Patient ask could she get a refill or 2 until she can get in with her PCP or if he will not write them she will have to go to [facility] she just leaves in [place] and has a hard time even getting to therapeutic

## 2020-09-03 NOTE — TELEPHONE ENCOUNTER
Yes, I will send in the refills.  She can follow-up with her PCP if she prefers, or be seen at the [facility].  Thanks.

## 2020-12-28 DIAGNOSIS — F41.1 GAD (GENERALIZED ANXIETY DISORDER): ICD-10-CM

## 2020-12-28 DIAGNOSIS — F33.1 MDD (MAJOR DEPRESSIVE DISORDER), RECURRENT EPISODE, MODERATE (HCC): ICD-10-CM

## 2020-12-28 RX ORDER — ARIPIPRAZOLE 2 MG/1
2 TABLET ORAL EVERY MORNING
Qty: 30 TABLET | Refills: 1 | OUTPATIENT
Start: 2020-12-28

## 2020-12-28 RX ORDER — ESCITALOPRAM OXALATE 10 MG/1
10 TABLET ORAL DAILY
Qty: 30 TABLET | Refills: 1 | OUTPATIENT
Start: 2020-12-28

## 2025-02-07 ENCOUNTER — TRANSCRIBE ORDERS (OUTPATIENT)
Dept: ADMINISTRATIVE | Facility: HOSPITAL | Age: 55
End: 2025-02-07
Payer: COMMERCIAL

## 2025-02-07 DIAGNOSIS — Z12.31 BREAST CANCER SCREENING BY MAMMOGRAM: Primary | ICD-10-CM
